# Patient Record
Sex: FEMALE | Race: BLACK OR AFRICAN AMERICAN | NOT HISPANIC OR LATINO
[De-identification: names, ages, dates, MRNs, and addresses within clinical notes are randomized per-mention and may not be internally consistent; named-entity substitution may affect disease eponyms.]

---

## 2019-07-21 ENCOUNTER — TRANSCRIPTION ENCOUNTER (OUTPATIENT)
Age: 49
End: 2019-07-21

## 2019-07-21 ENCOUNTER — EMERGENCY (EMERGENCY)
Facility: HOSPITAL | Age: 49
LOS: 0 days | Discharge: HOME | End: 2019-07-21
Attending: EMERGENCY MEDICINE
Payer: MEDICAID

## 2019-07-21 VITALS
HEART RATE: 92 BPM | TEMPERATURE: 98 F | DIASTOLIC BLOOD PRESSURE: 73 MMHG | HEIGHT: 64 IN | RESPIRATION RATE: 16 BRPM | WEIGHT: 151.9 LBS | SYSTOLIC BLOOD PRESSURE: 150 MMHG | OXYGEN SATURATION: 100 %

## 2019-07-21 VITALS
TEMPERATURE: 98 F | HEART RATE: 72 BPM | RESPIRATION RATE: 16 BRPM | OXYGEN SATURATION: 100 % | DIASTOLIC BLOOD PRESSURE: 73 MMHG | SYSTOLIC BLOOD PRESSURE: 144 MMHG

## 2019-07-21 DIAGNOSIS — N83.201 UNSPECIFIED OVARIAN CYST, RIGHT SIDE: ICD-10-CM

## 2019-07-21 DIAGNOSIS — R10.9 UNSPECIFIED ABDOMINAL PAIN: ICD-10-CM

## 2019-07-21 DIAGNOSIS — E11.9 TYPE 2 DIABETES MELLITUS WITHOUT COMPLICATIONS: ICD-10-CM

## 2019-07-21 DIAGNOSIS — Z97.0 PRESENCE OF ARTIFICIAL EYE: Chronic | ICD-10-CM

## 2019-07-21 LAB
ALBUMIN SERPL ELPH-MCNC: 3.5 G/DL — SIGNIFICANT CHANGE UP (ref 3.3–5)
ALP SERPL-CCNC: 81 U/L — SIGNIFICANT CHANGE UP (ref 40–120)
ALT FLD-CCNC: 16 U/L — SIGNIFICANT CHANGE UP (ref 12–78)
ANION GAP SERPL CALC-SCNC: 8 MMOL/L — SIGNIFICANT CHANGE UP (ref 5–17)
APPEARANCE UR: CLEAR — SIGNIFICANT CHANGE UP
AST SERPL-CCNC: 18 U/L — SIGNIFICANT CHANGE UP (ref 15–37)
BASOPHILS # BLD AUTO: 0.02 K/UL — SIGNIFICANT CHANGE UP (ref 0–0.2)
BASOPHILS NFR BLD AUTO: 0.2 % — SIGNIFICANT CHANGE UP (ref 0–2)
BILIRUB SERPL-MCNC: 1.1 MG/DL — SIGNIFICANT CHANGE UP (ref 0.2–1.2)
BILIRUB UR-MCNC: NEGATIVE — SIGNIFICANT CHANGE UP
BUN SERPL-MCNC: 9 MG/DL — SIGNIFICANT CHANGE UP (ref 7–23)
CALCIUM SERPL-MCNC: 9 MG/DL — SIGNIFICANT CHANGE UP (ref 8.5–10.1)
CHLORIDE SERPL-SCNC: 102 MMOL/L — SIGNIFICANT CHANGE UP (ref 96–108)
CO2 SERPL-SCNC: 25 MMOL/L — SIGNIFICANT CHANGE UP (ref 22–31)
COLOR SPEC: YELLOW — SIGNIFICANT CHANGE UP
CREAT SERPL-MCNC: 1.03 MG/DL — SIGNIFICANT CHANGE UP (ref 0.5–1.3)
DIFF PNL FLD: ABNORMAL
EOSINOPHIL # BLD AUTO: 0.01 K/UL — SIGNIFICANT CHANGE UP (ref 0–0.5)
EOSINOPHIL NFR BLD AUTO: 0.1 % — SIGNIFICANT CHANGE UP (ref 0–6)
GLUCOSE BLDC GLUCOMTR-MCNC: 311 MG/DL — HIGH (ref 70–99)
GLUCOSE SERPL-MCNC: 266 MG/DL — HIGH (ref 70–99)
GLUCOSE UR QL: 1000 MG/DL
HCG SERPL-ACNC: <1 MIU/ML — SIGNIFICANT CHANGE UP
HCT VFR BLD CALC: 38.8 % — SIGNIFICANT CHANGE UP (ref 34.5–45)
HGB BLD-MCNC: 13.1 G/DL — SIGNIFICANT CHANGE UP (ref 11.5–15.5)
IMM GRANULOCYTES NFR BLD AUTO: 0.3 % — SIGNIFICANT CHANGE UP (ref 0–1.5)
KETONES UR-MCNC: ABNORMAL
LEUKOCYTE ESTERASE UR-ACNC: ABNORMAL
LIDOCAIN IGE QN: 45 U/L — LOW (ref 73–393)
LYMPHOCYTES # BLD AUTO: 1.36 K/UL — SIGNIFICANT CHANGE UP (ref 1–3.3)
LYMPHOCYTES # BLD AUTO: 13.1 % — SIGNIFICANT CHANGE UP (ref 13–44)
MCHC RBC-ENTMCNC: 29.2 PG — SIGNIFICANT CHANGE UP (ref 27–34)
MCHC RBC-ENTMCNC: 33.8 GM/DL — SIGNIFICANT CHANGE UP (ref 32–36)
MCV RBC AUTO: 86.6 FL — SIGNIFICANT CHANGE UP (ref 80–100)
MONOCYTES # BLD AUTO: 0.56 K/UL — SIGNIFICANT CHANGE UP (ref 0–0.9)
MONOCYTES NFR BLD AUTO: 5.4 % — SIGNIFICANT CHANGE UP (ref 2–14)
NEUTROPHILS # BLD AUTO: 8.44 K/UL — HIGH (ref 1.8–7.4)
NEUTROPHILS NFR BLD AUTO: 80.9 % — HIGH (ref 43–77)
NITRITE UR-MCNC: NEGATIVE — SIGNIFICANT CHANGE UP
NRBC # BLD: 0 /100 WBCS — SIGNIFICANT CHANGE UP (ref 0–0)
PH UR: 5 — SIGNIFICANT CHANGE UP (ref 5–8)
PLATELET # BLD AUTO: 264 K/UL — SIGNIFICANT CHANGE UP (ref 150–400)
POTASSIUM SERPL-MCNC: 4.5 MMOL/L — SIGNIFICANT CHANGE UP (ref 3.5–5.3)
POTASSIUM SERPL-SCNC: 4.5 MMOL/L — SIGNIFICANT CHANGE UP (ref 3.5–5.3)
PROT SERPL-MCNC: 8.4 GM/DL — HIGH (ref 6–8.3)
PROT UR-MCNC: NEGATIVE MG/DL — SIGNIFICANT CHANGE UP
RBC # BLD: 4.48 M/UL — SIGNIFICANT CHANGE UP (ref 3.8–5.2)
RBC # FLD: 11.9 % — SIGNIFICANT CHANGE UP (ref 10.3–14.5)
SODIUM SERPL-SCNC: 135 MMOL/L — SIGNIFICANT CHANGE UP (ref 135–145)
SP GR SPEC: 1 — LOW (ref 1.01–1.02)
UROBILINOGEN FLD QL: NEGATIVE MG/DL — SIGNIFICANT CHANGE UP
WBC # BLD: 10.42 K/UL — SIGNIFICANT CHANGE UP (ref 3.8–10.5)
WBC # FLD AUTO: 10.42 K/UL — SIGNIFICANT CHANGE UP (ref 3.8–10.5)

## 2019-07-21 PROCEDURE — 74177 CT ABD & PELVIS W/CONTRAST: CPT | Mod: 26

## 2019-07-21 PROCEDURE — 99285 EMERGENCY DEPT VISIT HI MDM: CPT

## 2019-07-21 PROCEDURE — 76830 TRANSVAGINAL US NON-OB: CPT | Mod: 26

## 2019-07-21 RX ORDER — SODIUM CHLORIDE 9 MG/ML
1000 INJECTION INTRAMUSCULAR; INTRAVENOUS; SUBCUTANEOUS ONCE
Refills: 0 | Status: COMPLETED | OUTPATIENT
Start: 2019-07-21 | End: 2019-07-21

## 2019-07-21 RX ORDER — KETOROLAC TROMETHAMINE 30 MG/ML
15 SYRINGE (ML) INJECTION ONCE
Refills: 0 | Status: DISCONTINUED | OUTPATIENT
Start: 2019-07-21 | End: 2019-07-21

## 2019-07-21 RX ORDER — IBUPROFEN 200 MG
1 TABLET ORAL
Qty: 30 | Refills: 0
Start: 2019-07-21 | End: 2019-07-30

## 2019-07-21 RX ORDER — MORPHINE SULFATE 50 MG/1
4 CAPSULE, EXTENDED RELEASE ORAL ONCE
Refills: 0 | Status: DISCONTINUED | OUTPATIENT
Start: 2019-07-21 | End: 2019-07-21

## 2019-07-21 RX ORDER — ONDANSETRON 8 MG/1
4 TABLET, FILM COATED ORAL ONCE
Refills: 0 | Status: COMPLETED | OUTPATIENT
Start: 2019-07-21 | End: 2019-07-21

## 2019-07-21 RX ORDER — IOHEXOL 300 MG/ML
30 INJECTION, SOLUTION INTRAVENOUS ONCE
Refills: 0 | Status: COMPLETED | OUTPATIENT
Start: 2019-07-21 | End: 2019-07-21

## 2019-07-21 RX ADMIN — MORPHINE SULFATE 4 MILLIGRAM(S): 50 CAPSULE, EXTENDED RELEASE ORAL at 13:37

## 2019-07-21 RX ADMIN — ONDANSETRON 4 MILLIGRAM(S): 8 TABLET, FILM COATED ORAL at 12:40

## 2019-07-21 RX ADMIN — SODIUM CHLORIDE 1000 MILLILITER(S): 9 INJECTION INTRAMUSCULAR; INTRAVENOUS; SUBCUTANEOUS at 17:35

## 2019-07-21 RX ADMIN — Medication 15 MILLIGRAM(S): at 19:15

## 2019-07-21 RX ADMIN — MORPHINE SULFATE 4 MILLIGRAM(S): 50 CAPSULE, EXTENDED RELEASE ORAL at 17:34

## 2019-07-21 RX ADMIN — SODIUM CHLORIDE 1000 MILLILITER(S): 9 INJECTION INTRAMUSCULAR; INTRAVENOUS; SUBCUTANEOUS at 13:30

## 2019-07-21 RX ADMIN — MORPHINE SULFATE 4 MILLIGRAM(S): 50 CAPSULE, EXTENDED RELEASE ORAL at 14:07

## 2019-07-21 RX ADMIN — MORPHINE SULFATE 4 MILLIGRAM(S): 50 CAPSULE, EXTENDED RELEASE ORAL at 18:04

## 2019-07-21 RX ADMIN — SODIUM CHLORIDE 1000 MILLILITER(S): 9 INJECTION INTRAMUSCULAR; INTRAVENOUS; SUBCUTANEOUS at 13:37

## 2019-07-21 RX ADMIN — IOHEXOL 30 MILLILITER(S): 300 INJECTION, SOLUTION INTRAVENOUS at 13:23

## 2019-07-21 NOTE — ED ADULT NURSE NOTE - CHIEF COMPLAINT QUOTE
pt c/o RLQ PAIN STARTED LAST WEEK  AT THAT TIME ONLY N/V ASSOCIATED WITH THE DISCOMFORT TODAY, PT STATES 9/10 PAIN SCALE

## 2019-07-21 NOTE — ED PROVIDER NOTE - CARE PROVIDER_API CALL
Joel De La Cruz)  Obstetrics and Gynecology  [address] [place] [address]  Phone: [phone]  Fax: [phone]  Follow Up Time:

## 2019-07-21 NOTE — ED ADULT NURSE NOTE - NSIMPLEMENTINTERV_GEN_ALL_ED
Implemented All Universal Safety Interventions:  [place] to call system. Call bell, personal items and telephone within reach. Instruct patient to call for assistance. Room bathroom lighting operational. Non-slip footwear when patient is off stretcher. Physically safe environment: no spills, clutter or unnecessary equipment. Stretcher in lowest position, wheels locked, appropriate side rails in place.

## 2019-07-21 NOTE — ED PROVIDER NOTE - CLINICAL SUMMARY MEDICAL DECISION MAKING FREE TEXT BOX
Pt with large ovarian cyst. DW Dr Tompkins x 2, pt improved, tolerating po. Will dc to follow up with GYN tomorrow morning with copies of results, to return if worse. Precautions reviewed.

## 2019-07-21 NOTE — ED PROVIDER NOTE - OBJECTIVE STATEMENT
48 year old female came to the ED because of right lower abd pain that is associated with decreased appetite. She has had these symptoms for the last 3 days and it feels different than when she had a kidney stone. No fever, no chills, no urinary complaints, no headache, no back pain, no chest pain, no sob.

## 2019-07-21 NOTE — ED PROVIDER NOTE - PROGRESS NOTE DETAILS
DW Dr Amaro, he recommends dc and outpatient follow up. Pt is feeling better and knows that she must follow up. She has gyn at a private clinic, but will give the [facility] and dr amaro's info. Precautions reviewed.

## 2019-07-22 ENCOUNTER — RESULT REVIEW (OUTPATIENT)
Age: 49
End: 2019-07-22

## 2019-07-22 ENCOUNTER — TRANSCRIPTION ENCOUNTER (OUTPATIENT)
Age: 49
End: 2019-07-22

## 2019-07-22 ENCOUNTER — INPATIENT (INPATIENT)
Facility: HOSPITAL | Age: 49
LOS: 0 days | Discharge: HOME | End: 2019-07-23
Attending: OBSTETRICS & GYNECOLOGY | Admitting: OBSTETRICS & GYNECOLOGY
Payer: MEDICAID

## 2019-07-22 VITALS
TEMPERATURE: 98 F | RESPIRATION RATE: 20 BRPM | OXYGEN SATURATION: 100 % | SYSTOLIC BLOOD PRESSURE: 160 MMHG | HEART RATE: 85 BPM | DIASTOLIC BLOOD PRESSURE: 81 MMHG

## 2019-07-22 DIAGNOSIS — N83.201 UNSPECIFIED OVARIAN CYST, RIGHT SIDE: ICD-10-CM

## 2019-07-22 DIAGNOSIS — Z98.890 OTHER SPECIFIED POSTPROCEDURAL STATES: Chronic | ICD-10-CM

## 2019-07-22 DIAGNOSIS — Z97.0 PRESENCE OF ARTIFICIAL EYE: Chronic | ICD-10-CM

## 2019-07-22 DIAGNOSIS — Z98.891 HISTORY OF UTERINE SCAR FROM PREVIOUS SURGERY: Chronic | ICD-10-CM

## 2019-07-22 LAB
ALBUMIN SERPL ELPH-MCNC: 3.5 G/DL — SIGNIFICANT CHANGE UP (ref 3.3–5)
ALP SERPL-CCNC: 64 U/L — SIGNIFICANT CHANGE UP (ref 40–120)
ALT FLD-CCNC: 8 U/L — SIGNIFICANT CHANGE UP (ref 4–33)
ANION GAP SERPL CALC-SCNC: 10 MMO/L — SIGNIFICANT CHANGE UP (ref 7–14)
APPEARANCE UR: CLEAR — SIGNIFICANT CHANGE UP
APTT BLD: 35.6 SEC — SIGNIFICANT CHANGE UP (ref 27.5–36.3)
AST SERPL-CCNC: 16 U/L — SIGNIFICANT CHANGE UP (ref 4–32)
BACTERIA # UR AUTO: SIGNIFICANT CHANGE UP
BASE EXCESS BLDV CALC-SCNC: 3.8 MMOL/L — SIGNIFICANT CHANGE UP
BASOPHILS # BLD AUTO: 0.01 K/UL — SIGNIFICANT CHANGE UP (ref 0–0.2)
BASOPHILS NFR BLD AUTO: 0.1 % — SIGNIFICANT CHANGE UP (ref 0–2)
BASOPHILS NFR SPEC: 0 % — SIGNIFICANT CHANGE UP (ref 0–2)
BILIRUB SERPL-MCNC: 0.7 MG/DL — SIGNIFICANT CHANGE UP (ref 0.2–1.2)
BILIRUB UR-MCNC: NEGATIVE — SIGNIFICANT CHANGE UP
BLASTS # FLD: 0 % — SIGNIFICANT CHANGE UP (ref 0–0)
BLD GP AB SCN SERPL QL: NEGATIVE — SIGNIFICANT CHANGE UP
BLOOD GAS VENOUS - CREATININE: SIGNIFICANT CHANGE UP MG/DL (ref 0.5–1.3)
BLOOD UR QL VISUAL: SIGNIFICANT CHANGE UP
BUN SERPL-MCNC: 9 MG/DL — SIGNIFICANT CHANGE UP (ref 7–23)
CALCIUM SERPL-MCNC: 9.2 MG/DL — SIGNIFICANT CHANGE UP (ref 8.4–10.5)
CHLORIDE BLDV-SCNC: 106 MMOL/L — SIGNIFICANT CHANGE UP (ref 96–108)
CHLORIDE SERPL-SCNC: 105 MMOL/L — SIGNIFICANT CHANGE UP (ref 98–107)
CO2 SERPL-SCNC: 25 MMOL/L — SIGNIFICANT CHANGE UP (ref 22–31)
COLOR SPEC: SIGNIFICANT CHANGE UP
CREAT SERPL-MCNC: 0.65 MG/DL — SIGNIFICANT CHANGE UP (ref 0.5–1.3)
CULTURE RESULTS: SIGNIFICANT CHANGE UP
EOSINOPHIL # BLD AUTO: 0 K/UL — SIGNIFICANT CHANGE UP (ref 0–0.5)
EOSINOPHIL NFR BLD AUTO: 0 % — SIGNIFICANT CHANGE UP (ref 0–6)
EOSINOPHIL NFR FLD: 0 % — SIGNIFICANT CHANGE UP (ref 0–6)
GAS PNL BLDV: 136 MMOL/L — SIGNIFICANT CHANGE UP (ref 136–146)
GIANT PLATELETS BLD QL SMEAR: PRESENT — SIGNIFICANT CHANGE UP
GLUCOSE BLDC GLUCOMTR-MCNC: 105 MG/DL — HIGH (ref 70–99)
GLUCOSE BLDV-MCNC: 151 MG/DL — HIGH (ref 70–99)
GLUCOSE SERPL-MCNC: 152 MG/DL — HIGH (ref 70–99)
GLUCOSE UR-MCNC: 100 — SIGNIFICANT CHANGE UP
HCO3 BLDV-SCNC: 26 MMOL/L — SIGNIFICANT CHANGE UP (ref 20–27)
HCT VFR BLD CALC: 38.6 % — SIGNIFICANT CHANGE UP (ref 34.5–45)
HCT VFR BLDV CALC: 39.3 % — SIGNIFICANT CHANGE UP (ref 34.5–45)
HGB BLD-MCNC: 12.9 G/DL — SIGNIFICANT CHANGE UP (ref 11.5–15.5)
HGB BLDV-MCNC: 12.8 G/DL — SIGNIFICANT CHANGE UP (ref 11.5–15.5)
HYALINE CASTS # UR AUTO: NEGATIVE — SIGNIFICANT CHANGE UP
IMM GRANULOCYTES NFR BLD AUTO: 0.4 % — SIGNIFICANT CHANGE UP (ref 0–1.5)
INR BLD: 1.11 — SIGNIFICANT CHANGE UP (ref 0.88–1.17)
KETONES UR-MCNC: NEGATIVE — SIGNIFICANT CHANGE UP
LACTATE BLDV-MCNC: 1.6 MMOL/L — SIGNIFICANT CHANGE UP (ref 0.5–2)
LEUKOCYTE ESTERASE UR-ACNC: SIGNIFICANT CHANGE UP
LYMPHOCYTES # BLD AUTO: 1.33 K/UL — SIGNIFICANT CHANGE UP (ref 1–3.3)
LYMPHOCYTES # BLD AUTO: 13.9 % — SIGNIFICANT CHANGE UP (ref 13–44)
LYMPHOCYTES NFR SPEC AUTO: 17.4 % — SIGNIFICANT CHANGE UP (ref 13–44)
MACROCYTES BLD QL: SLIGHT — SIGNIFICANT CHANGE UP
MCHC RBC-ENTMCNC: 29 PG — SIGNIFICANT CHANGE UP (ref 27–34)
MCHC RBC-ENTMCNC: 33.4 % — SIGNIFICANT CHANGE UP (ref 32–36)
MCV RBC AUTO: 86.7 FL — SIGNIFICANT CHANGE UP (ref 80–100)
METAMYELOCYTES # FLD: 0 % — SIGNIFICANT CHANGE UP (ref 0–1)
MICROCYTES BLD QL: SIGNIFICANT CHANGE UP
MONOCYTES # BLD AUTO: 0.64 K/UL — SIGNIFICANT CHANGE UP (ref 0–0.9)
MONOCYTES NFR BLD AUTO: 6.7 % — SIGNIFICANT CHANGE UP (ref 2–14)
MONOCYTES NFR BLD: 7 % — SIGNIFICANT CHANGE UP (ref 2–9)
MYELOCYTES NFR BLD: 0 % — SIGNIFICANT CHANGE UP (ref 0–0)
NEUTROPHIL AB SER-ACNC: 70.4 % — SIGNIFICANT CHANGE UP (ref 43–77)
NEUTROPHILS # BLD AUTO: 7.55 K/UL — HIGH (ref 1.8–7.4)
NEUTROPHILS NFR BLD AUTO: 78.9 % — HIGH (ref 43–77)
NEUTS BAND # BLD: 0.9 % — SIGNIFICANT CHANGE UP (ref 0–6)
NITRITE UR-MCNC: NEGATIVE — SIGNIFICANT CHANGE UP
NRBC # FLD: 0.04 K/UL — SIGNIFICANT CHANGE UP (ref 0–0)
OTHER - HEMATOLOGY %: 0 — SIGNIFICANT CHANGE UP
PCO2 BLDV: 49 MMHG — SIGNIFICANT CHANGE UP (ref 41–51)
PH BLDV: 7.38 PH — SIGNIFICANT CHANGE UP (ref 7.32–7.43)
PH UR: 7 — SIGNIFICANT CHANGE UP (ref 5–8)
PLATELET # BLD AUTO: 254 K/UL — SIGNIFICANT CHANGE UP (ref 150–400)
PLATELET COUNT - ESTIMATE: NORMAL — SIGNIFICANT CHANGE UP
PMV BLD: 12.2 FL — SIGNIFICANT CHANGE UP (ref 7–13)
PO2 BLDV: 30 MMHG — LOW (ref 35–40)
POTASSIUM BLDV-SCNC: 3.8 MMOL/L — SIGNIFICANT CHANGE UP (ref 3.4–4.5)
POTASSIUM SERPL-MCNC: 4.2 MMOL/L — SIGNIFICANT CHANGE UP (ref 3.5–5.3)
POTASSIUM SERPL-SCNC: 4.2 MMOL/L — SIGNIFICANT CHANGE UP (ref 3.5–5.3)
PROMYELOCYTES # FLD: 0 % — SIGNIFICANT CHANGE UP (ref 0–0)
PROT SERPL-MCNC: 7.5 G/DL — SIGNIFICANT CHANGE UP (ref 6–8.3)
PROT UR-MCNC: NEGATIVE — SIGNIFICANT CHANGE UP
PROTHROM AB SERPL-ACNC: 12.7 SEC — SIGNIFICANT CHANGE UP (ref 9.8–13.1)
RBC # BLD: 4.45 M/UL — SIGNIFICANT CHANGE UP (ref 3.8–5.2)
RBC # FLD: 11.9 % — SIGNIFICANT CHANGE UP (ref 10.3–14.5)
RBC CASTS # UR COMP ASSIST: SIGNIFICANT CHANGE UP (ref 0–?)
RH IG SCN BLD-IMP: POSITIVE — SIGNIFICANT CHANGE UP
RH IG SCN BLD-IMP: POSITIVE — SIGNIFICANT CHANGE UP
SAO2 % BLDV: 54.7 % — LOW (ref 60–85)
SODIUM SERPL-SCNC: 140 MMOL/L — SIGNIFICANT CHANGE UP (ref 135–145)
SP GR SPEC: 1.01 — SIGNIFICANT CHANGE UP (ref 1–1.04)
SPECIMEN SOURCE: SIGNIFICANT CHANGE UP
SQUAMOUS # UR AUTO: SIGNIFICANT CHANGE UP
UROBILINOGEN FLD QL: NORMAL — SIGNIFICANT CHANGE UP
VARIANT LYMPHS # BLD: 4.3 % — SIGNIFICANT CHANGE UP
WBC # BLD: 9.57 K/UL — SIGNIFICANT CHANGE UP (ref 3.8–10.5)
WBC # FLD AUTO: 9.57 K/UL — SIGNIFICANT CHANGE UP (ref 3.8–10.5)
WBC UR QL: SIGNIFICANT CHANGE UP (ref 0–?)

## 2019-07-22 PROCEDURE — 88305 TISSUE EXAM BY PATHOLOGIST: CPT | Mod: 26

## 2019-07-22 PROCEDURE — 58661 LAPAROSCOPY REMOVE ADNEXA: CPT

## 2019-07-22 RX ORDER — HYDROMORPHONE HYDROCHLORIDE 2 MG/ML
0.5 INJECTION INTRAMUSCULAR; INTRAVENOUS; SUBCUTANEOUS
Refills: 0 | Status: DISCONTINUED | OUTPATIENT
Start: 2019-07-22 | End: 2019-07-23

## 2019-07-22 RX ORDER — MORPHINE SULFATE 50 MG/1
2 CAPSULE, EXTENDED RELEASE ORAL ONCE
Refills: 0 | Status: DISCONTINUED | OUTPATIENT
Start: 2019-07-22 | End: 2019-07-22

## 2019-07-22 RX ORDER — INSULIN LISPRO 100/ML
VIAL (ML) SUBCUTANEOUS
Refills: 0 | Status: DISCONTINUED | OUTPATIENT
Start: 2019-07-22 | End: 2019-07-23

## 2019-07-22 RX ORDER — DEXTROSE 50 % IN WATER 50 %
15 SYRINGE (ML) INTRAVENOUS ONCE
Refills: 0 | Status: DISCONTINUED | OUTPATIENT
Start: 2019-07-22 | End: 2019-07-23

## 2019-07-22 RX ORDER — GLUCAGON INJECTION, SOLUTION 0.5 MG/.1ML
1 INJECTION, SOLUTION SUBCUTANEOUS ONCE
Refills: 0 | Status: DISCONTINUED | OUTPATIENT
Start: 2019-07-22 | End: 2019-07-23

## 2019-07-22 RX ORDER — INSULIN LISPRO 100/ML
VIAL (ML) SUBCUTANEOUS AT BEDTIME
Refills: 0 | Status: DISCONTINUED | OUTPATIENT
Start: 2019-07-22 | End: 2019-07-23

## 2019-07-22 RX ORDER — SODIUM CHLORIDE 9 MG/ML
1000 INJECTION, SOLUTION INTRAVENOUS
Refills: 0 | Status: DISCONTINUED | OUTPATIENT
Start: 2019-07-22 | End: 2019-07-23

## 2019-07-22 RX ORDER — DEXTROSE 50 % IN WATER 50 %
25 SYRINGE (ML) INTRAVENOUS ONCE
Refills: 0 | Status: DISCONTINUED | OUTPATIENT
Start: 2019-07-22 | End: 2019-07-23

## 2019-07-22 RX ORDER — FENTANYL CITRATE 50 UG/ML
50 INJECTION INTRAVENOUS
Refills: 0 | Status: DISCONTINUED | OUTPATIENT
Start: 2019-07-22 | End: 2019-07-23

## 2019-07-22 RX ORDER — DEXTROSE 50 % IN WATER 50 %
12.5 SYRINGE (ML) INTRAVENOUS ONCE
Refills: 0 | Status: DISCONTINUED | OUTPATIENT
Start: 2019-07-22 | End: 2019-07-23

## 2019-07-22 RX ORDER — ONDANSETRON 8 MG/1
4 TABLET, FILM COATED ORAL ONCE
Refills: 0 | Status: DISCONTINUED | OUTPATIENT
Start: 2019-07-22 | End: 2019-07-23

## 2019-07-22 RX ORDER — SODIUM CHLORIDE 9 MG/ML
1000 INJECTION, SOLUTION INTRAVENOUS
Refills: 0 | Status: DISCONTINUED | OUTPATIENT
Start: 2019-07-22 | End: 2019-07-22

## 2019-07-22 RX ORDER — MORPHINE SULFATE 50 MG/1
4 CAPSULE, EXTENDED RELEASE ORAL ONCE
Refills: 0 | Status: DISCONTINUED | OUTPATIENT
Start: 2019-07-22 | End: 2019-07-22

## 2019-07-22 RX ADMIN — MORPHINE SULFATE 2 MILLIGRAM(S): 50 CAPSULE, EXTENDED RELEASE ORAL at 18:24

## 2019-07-22 RX ADMIN — SODIUM CHLORIDE 125 MILLILITER(S): 9 INJECTION, SOLUTION INTRAVENOUS at 23:45

## 2019-07-22 RX ADMIN — MORPHINE SULFATE 2 MILLIGRAM(S): 50 CAPSULE, EXTENDED RELEASE ORAL at 18:54

## 2019-07-22 RX ADMIN — MORPHINE SULFATE 2 MILLIGRAM(S): 50 CAPSULE, EXTENDED RELEASE ORAL at 14:16

## 2019-07-22 RX ADMIN — SODIUM CHLORIDE 125 MILLILITER(S): 9 INJECTION, SOLUTION INTRAVENOUS at 14:16

## 2019-07-22 NOTE — H&P ADULT - NSHPLABSRESULTS_GEN_ALL_CORE
TVUS (7/21 at [facility]): 10cm cyst of right ovary with single septation, imagees reviewed with Dr Trimble

## 2019-07-22 NOTE — H&P ADULT - ASSESSMENT
48  with suspected ovarian torsion. The patient understands that she will need surgical management due to suspected torsion. We discussed with management of torsion and the recommendation to perform salpingooophorectomy due to her age.

## 2019-07-22 NOTE — ED ADULT NURSE NOTE - CHIEF COMPLAINT QUOTE
c/o rt lower abdominal pain since Thursday, was at [facility] yesterday and dx with rt cyst, told to come to [facility] if pain got worse, LMP 7/17

## 2019-07-22 NOTE — BRIEF OPERATIVE NOTE - NSICDXBRIEFPROCEDURE_GEN_ALL_CORE_FT
PROCEDURES:  Right ureterolysis 22-Jul-2019 23:36:12  Madison Romeo  Laparoscopic salpingo-oophorectomy, right 22-Jul-2019 23:35:59  Madison Romeo

## 2019-07-22 NOTE — ED ADULT TRIAGE NOTE - CHIEF COMPLAINT QUOTE
c/o rt lower abdominal pain since Thursday, was at [facility] yesterday and dx with rt cyst, told to come to [facility] if pain got worse, LMP 7/17

## 2019-07-22 NOTE — ED ADULT NURSE NOTE - OBJECTIVE STATEMENT
pt received to intake #12 with c/o ovarian pain. states she was seen at [facility] and diagnosed with a right ovarian cyst, was told they do not have GYN services there and if pain increases to come [facility]. pt denies cp, sob, n/v/d, vaginal bleeding/discharge and urinary symptoms.  at bedside. seen by MD. IV placed, labs drawn and sent. pt to go to .

## 2019-07-22 NOTE — H&P ADULT - PROBLEM SELECTOR PLAN 1
-admit to GYN  -NPO, added on to OR for LSC RSO possible right possible ex-lap  -FS and ISS for T1DM  -Pain control  seen and examined with Dr. Atilio Steiner PGY4

## 2019-07-22 NOTE — H&P ADULT - HISTORY OF PRESENT ILLNESS
48  PMH T1DM presents with severe RLQ pain since Thursday. At first the pain felt like cramping/miscarriage pain. Yesterday she went to [facility] ED where she was diagnosed with large cyst with single septation and told to come to [facility] ED with worsening pain as they do not have GYN service. She has been unable to eat due to the pain since yesterday evening but tolerated some sips of water this AM. +nausea -vomiting. denies/fevers/chills. denies CP/SOB.

## 2019-07-22 NOTE — DISCHARGE NOTE PROVIDER - NSDCFUADDINST_GEN_ALL_CORE_FT
Postoperative Instructions    Pain control    For pain control, take the followin. Motrin 600mg four times a day, take with food  2. Add Tylenol 975 four times a day, alternated with motrin  3. Add oxycodone as needed for severe pain not managed well by motrin and tylenol. A prescription has been sent to your pharmacy on file.     Motrin and Tylenol can be obtained over the counter.    Postoperative restrictions  Nothing in the vagina (tampons, sexual intercourse), No tub baths, pools or hot tubs for 6 weeks (showers are ok!). No lifting anything heavier than 15 lbs, no strenuous exercise for 6 weeks after surgery. Do not pull or cut any stitches that you see around your incision.    Vaginal bleeding    Spotting per vagina is normal in first few weeks after surgery. If you are soaking 1 pad per hour, that is not normal and you should notify your doctor's office and seek medical attention right away.

## 2019-07-22 NOTE — ED PROVIDER NOTE - OBJECTIVE STATEMENT
47 yo F pmh c-sections in past, DM presents for abdominal pain.  Patient states pain started 4 days ago, RLQ, non-radiating, sharp, intermittent, feels at rest.  Went to [facility] one night ago and had CT and Ultrasound showing 9.3 cm right ovarian mass, but with good waveforms.  Patients pain improved and was discharged.  States this morning she is with worsening pain.  No fevers, chills, nausea, vomiting, vaginal bleeding, dysuria.

## 2019-07-22 NOTE — BRIEF OPERATIVE NOTE - OPERATION/FINDINGS
approx. 14cm right ovarian cyst adherent to ureterosacral ligament and in the broad ligament, not torsed. right pelvic side wall opened and ureter dissected out. Right round ligament transected for better visualization and right salpingoophorectomy performed, pelvic copiously irrigated and Elsa applied over pelvic side wall, intraoperative drainage of cyst for visualization approx. 14cm right ovarian cyst adherent to ureterosacral ligament and in the broad ligament, not torsed. right pelvic side wall opened and ureter dissected out. Right round ligament transected for better visualization and right salpingoophorectomy performed, pelvic copiously irrigated and Elsa applied over pelvic side wall, intraoperative drainage of cyst for visualization left fallopian tube and ovary wnl, upper abdomen within normal limits

## 2019-07-22 NOTE — DISCHARGE NOTE PROVIDER - NSDCCPTREATMENT_GEN_ALL_CORE_FT
PRINCIPAL PROCEDURE  Procedure: Laparoscopic salpingo-oophorectomy, right  Findings and Treatment:       SECONDARY PROCEDURE  Procedure: Right ureterolysis  Findings and Treatment:

## 2019-07-22 NOTE — ED PROVIDER NOTE - CLINICAL SUMMARY MEDICAL DECISION MAKING FREE TEXT BOX
49 yo F pmh c-sections in past, DM, known right ovarian cyst presents for abdominal pain.  VSS afebrile.  Exam non-toxic appearing, GYN exam with RLQ and adnexal tenderness.  Given ovarian mass, intermittent vs. active torsion considered.  GYN paged, possible repeat imaging.  Labs, IVF, pain control.  Dispo pending.

## 2019-07-22 NOTE — DISCHARGE NOTE PROVIDER - PROVIDER TOKENS
FREE:[LAST:[[facility]],PHONE:[phone]],FAX:[(   )    -],ADDRESS:[[facility]  Oncology Building  Third Floor  Ambulatory Care Unit],FOLLOWUP:[2 weeks]]

## 2019-07-22 NOTE — DISCHARGE NOTE PROVIDER - HOSPITAL COURSE
48y woman admitted with abdominal pain found to have 10cm simple septated cyst admitted for suspected torsion, now s/p laparoscopic Right Salpingo-oophorectomy, , Hct 38.6. No torsion noted at the time of surgery. Patient was discharged on POD#0 ambulating, tolerating PO, voiding spontaneously, and with stable vitals. Patient is to have close outpatient follow-up with her private gyn. 48y woman admitted with abdominal pain found to have 10cm simple septated cyst admitted for suspected torsion, now s/p laparoscopic Right Salpingo-oophorectomy, , Hct 38.6. No torsion noted at the time of surgery. Patient was discharged on POD#0 ambulating, tolerating PO, voiding spontaneously, and with stable vitals. Patient is to have close outpatient follow-up with her private gyn or [facility].

## 2019-07-22 NOTE — DISCHARGE NOTE PROVIDER - CARE PROVIDER_API CALL
[facility],   [facility]  Oncology Building  Third Floor  Ambulatory Care Unit  Phone: [phone]  Fax: (   )    -  Follow Up Time: 2 weeks

## 2019-07-22 NOTE — ED ADULT NURSE NOTE - NSIMPLEMENTINTERV_GEN_ALL_ED
Implemented All Universal Safety Interventions:  [place] to call system. Call bell, personal items and telephone within reach. Instruct patient to call for assistance. Room bathroom lighting operational. Non-slip footwear when patient is off stretcher. Physically safe environment: no spills, clutter or unnecessary equipment. Stretcher in lowest position, wheels locked, appropriate side rails in place.

## 2019-07-22 NOTE — H&P ADULT - NSHPPHYSICALEXAM_GEN_ALL_CORE
PE gen: writhing/distress due to pain  abd: TTP RLQ, +guarding  : deferred due to patient discomfort

## 2019-07-23 ENCOUNTER — TRANSCRIPTION ENCOUNTER (OUTPATIENT)
Age: 49
End: 2019-07-23

## 2019-07-23 ENCOUNTER — RESULT REVIEW (OUTPATIENT)
Age: 49
End: 2019-07-23

## 2019-07-23 VITALS
DIASTOLIC BLOOD PRESSURE: 54 MMHG | HEART RATE: 63 BPM | TEMPERATURE: 98 F | RESPIRATION RATE: 16 BRPM | SYSTOLIC BLOOD PRESSURE: 132 MMHG | OXYGEN SATURATION: 99 %

## 2019-07-23 PROBLEM — E11.9 TYPE 2 DIABETES MELLITUS WITHOUT COMPLICATIONS: Chronic | Status: ACTIVE | Noted: 2019-07-21

## 2019-07-23 PROBLEM — S05.90XA UNSPECIFIED INJURY OF UNSPECIFIED EYE AND ORBIT, INITIAL ENCOUNTER: Chronic | Status: ACTIVE | Noted: 2019-07-21

## 2019-07-23 LAB — HBA1C BLD-MCNC: 9.6 % — HIGH (ref 4–5.6)

## 2019-07-23 PROCEDURE — 88112 CYTOPATH CELL ENHANCE TECH: CPT | Mod: 26

## 2019-07-23 RX ORDER — IBUPROFEN 200 MG
1 TABLET ORAL
Qty: 0 | Refills: 0 | DISCHARGE
Start: 2019-07-23

## 2019-07-23 RX ORDER — ACETAMINOPHEN 500 MG
975 TABLET ORAL EVERY 6 HOURS
Refills: 0 | Status: DISCONTINUED | OUTPATIENT
Start: 2019-07-23 | End: 2019-07-23

## 2019-07-23 RX ORDER — OXYCODONE HYDROCHLORIDE 5 MG/1
1 TABLET ORAL
Qty: 10 | Refills: 0
Start: 2019-07-23

## 2019-07-23 RX ORDER — IBUPROFEN 200 MG
600 TABLET ORAL EVERY 6 HOURS
Refills: 0 | Status: DISCONTINUED | OUTPATIENT
Start: 2019-07-23 | End: 2019-07-23

## 2019-07-23 RX ORDER — OXYCODONE HYDROCHLORIDE 5 MG/1
5 TABLET ORAL EVERY 6 HOURS
Refills: 0 | Status: DISCONTINUED | OUTPATIENT
Start: 2019-07-23 | End: 2019-07-23

## 2019-07-23 RX ORDER — ACETAMINOPHEN 500 MG
3 TABLET ORAL
Qty: 0 | Refills: 0 | DISCHARGE
Start: 2019-07-23

## 2019-07-23 RX ADMIN — SODIUM CHLORIDE 125 MILLILITER(S): 9 INJECTION, SOLUTION INTRAVENOUS at 05:09

## 2019-07-23 NOTE — DISCHARGE NOTE NURSING/CASE MANAGEMENT/SOCIAL WORK - NSDCPNINST_GEN_ALL_CORE
SxS of infection.  Fever, pain at incision site, redness, swelling, pus, bleeding.  If unable to reach MD please go to the emergency room.

## 2019-07-23 NOTE — DISCHARGE NOTE NURSING/CASE MANAGEMENT/SOCIAL WORK - MODE OF TRANSPORTATION
Wheelchair/Stroller
Do not make important decisions/Do not drive or operate machinery/No Heavy lifting/straining

## 2019-07-23 NOTE — DISCHARGE NOTE NURSING/CASE MANAGEMENT/SOCIAL WORK - NSDCDPATPORTLINK_GEN_ALL_CORE
You can access the [facility] Patient Portal, offered by [facility], by registering with the following website: [email or web address]

## 2019-07-24 LAB — NON-GYNECOLOGICAL CYTOLOGY STUDY: SIGNIFICANT CHANGE UP

## 2019-07-25 PROBLEM — E10.9 TYPE 1 DIABETES MELLITUS WITHOUT COMPLICATIONS: Chronic | Status: ACTIVE | Noted: 2019-07-22

## 2019-07-25 PROBLEM — N83.201 UNSPECIFIED OVARIAN CYST, RIGHT SIDE: Chronic | Status: ACTIVE | Noted: 2019-07-22

## 2019-07-25 PROBLEM — Z00.00 ENCOUNTER FOR PREVENTIVE HEALTH EXAMINATION: Status: ACTIVE | Noted: 2019-07-25

## 2019-07-27 ENCOUNTER — INPATIENT (INPATIENT)
Facility: HOSPITAL | Age: 49
LOS: 6 days | Discharge: HOME | End: 2019-08-03
Attending: INTERNAL MEDICINE | Admitting: INTERNAL MEDICINE
Payer: MEDICAID

## 2019-07-27 VITALS
WEIGHT: 149.91 LBS | TEMPERATURE: 98 F | RESPIRATION RATE: 20 BRPM | DIASTOLIC BLOOD PRESSURE: 78 MMHG | OXYGEN SATURATION: 100 % | SYSTOLIC BLOOD PRESSURE: 141 MMHG | HEART RATE: 107 BPM

## 2019-07-27 DIAGNOSIS — E11.10 TYPE 2 DIABETES MELLITUS WITH KETOACIDOSIS WITHOUT COMA: ICD-10-CM

## 2019-07-27 DIAGNOSIS — Z98.890 OTHER SPECIFIED POSTPROCEDURAL STATES: Chronic | ICD-10-CM

## 2019-07-27 DIAGNOSIS — Z98.891 HISTORY OF UTERINE SCAR FROM PREVIOUS SURGERY: Chronic | ICD-10-CM

## 2019-07-27 DIAGNOSIS — Z97.0 PRESENCE OF ARTIFICIAL EYE: Chronic | ICD-10-CM

## 2019-07-27 LAB
ALBUMIN SERPL ELPH-MCNC: 3.7 G/DL — SIGNIFICANT CHANGE UP (ref 3.3–5)
ALP SERPL-CCNC: 103 U/L — SIGNIFICANT CHANGE UP (ref 40–120)
ALT FLD-CCNC: 6 U/L — SIGNIFICANT CHANGE UP (ref 4–33)
ANION GAP SERPL CALC-SCNC: 17 MMO/L — HIGH (ref 7–14)
ANION GAP SERPL CALC-SCNC: 23 MMO/L — HIGH (ref 7–14)
APPEARANCE UR: CLEAR — SIGNIFICANT CHANGE UP
APTT BLD: 35.2 SEC — SIGNIFICANT CHANGE UP (ref 27.5–36.3)
AST SERPL-CCNC: 6 U/L — SIGNIFICANT CHANGE UP (ref 4–32)
B-OH-BUTYR SERPL-SCNC: 6.1 MMOL/L — HIGH (ref 0–0.4)
BACTERIA # UR AUTO: NEGATIVE — SIGNIFICANT CHANGE UP
BASE EXCESS BLDV CALC-SCNC: -1.2 MMOL/L — SIGNIFICANT CHANGE UP
BASE EXCESS BLDV CALC-SCNC: -3.8 MMOL/L — SIGNIFICANT CHANGE UP
BASOPHILS # BLD AUTO: 0.02 K/UL — SIGNIFICANT CHANGE UP (ref 0–0.2)
BASOPHILS NFR BLD AUTO: 0.1 % — SIGNIFICANT CHANGE UP (ref 0–2)
BILIRUB SERPL-MCNC: 0.5 MG/DL — SIGNIFICANT CHANGE UP (ref 0.2–1.2)
BILIRUB UR-MCNC: NEGATIVE — SIGNIFICANT CHANGE UP
BLOOD GAS VENOUS - CREATININE: 0.83 MG/DL — SIGNIFICANT CHANGE UP (ref 0.5–1.3)
BLOOD GAS VENOUS - CREATININE: SIGNIFICANT CHANGE UP MG/DL (ref 0.5–1.3)
BLOOD UR QL VISUAL: HIGH
BUN SERPL-MCNC: 13 MG/DL — SIGNIFICANT CHANGE UP (ref 7–23)
BUN SERPL-MCNC: 9 MG/DL — SIGNIFICANT CHANGE UP (ref 7–23)
CALCIUM SERPL-MCNC: 10.3 MG/DL — SIGNIFICANT CHANGE UP (ref 8.4–10.5)
CALCIUM SERPL-MCNC: 9.1 MG/DL — SIGNIFICANT CHANGE UP (ref 8.4–10.5)
CHLORIDE BLDV-SCNC: 100 MMOL/L — SIGNIFICANT CHANGE UP (ref 96–108)
CHLORIDE BLDV-SCNC: 100 MMOL/L — SIGNIFICANT CHANGE UP (ref 96–108)
CHLORIDE SERPL-SCNC: 100 MMOL/L — SIGNIFICANT CHANGE UP (ref 98–107)
CHLORIDE SERPL-SCNC: 92 MMOL/L — LOW (ref 98–107)
CO2 SERPL-SCNC: 16 MMOL/L — LOW (ref 22–31)
CO2 SERPL-SCNC: 20 MMOL/L — LOW (ref 22–31)
COLOR SPEC: SIGNIFICANT CHANGE UP
CREAT SERPL-MCNC: 0.81 MG/DL — SIGNIFICANT CHANGE UP (ref 0.5–1.3)
CREAT SERPL-MCNC: 0.92 MG/DL — SIGNIFICANT CHANGE UP (ref 0.5–1.3)
EOSINOPHIL # BLD AUTO: 0 K/UL — SIGNIFICANT CHANGE UP (ref 0–0.5)
EOSINOPHIL NFR BLD AUTO: 0 % — SIGNIFICANT CHANGE UP (ref 0–6)
GAS PNL BLDV: 132 MMOL/L — LOW (ref 136–146)
GAS PNL BLDV: 135 MMOL/L — LOW (ref 136–146)
GLUCOSE BLDV-MCNC: 343 MG/DL — HIGH (ref 70–99)
GLUCOSE BLDV-MCNC: 436 MG/DL — HIGH (ref 70–99)
GLUCOSE SERPL-MCNC: 219 MG/DL — HIGH (ref 70–99)
GLUCOSE SERPL-MCNC: 442 MG/DL — HIGH (ref 70–99)
GLUCOSE UR-MCNC: >1000 — HIGH
HCG SERPL-ACNC: < 5 MIU/ML — SIGNIFICANT CHANGE UP
HCO3 BLDV-SCNC: 19 MMOL/L — LOW (ref 20–27)
HCO3 BLDV-SCNC: 22 MMOL/L — SIGNIFICANT CHANGE UP (ref 20–27)
HCT VFR BLD CALC: 39 % — SIGNIFICANT CHANGE UP (ref 34.5–45)
HCT VFR BLDV CALC: 35.2 % — SIGNIFICANT CHANGE UP (ref 34.5–45)
HCT VFR BLDV CALC: 38.5 % — SIGNIFICANT CHANGE UP (ref 34.5–45)
HGB BLD-MCNC: 12.9 G/DL — SIGNIFICANT CHANGE UP (ref 11.5–15.5)
HGB BLDV-MCNC: 11.4 G/DL — LOW (ref 11.5–15.5)
HGB BLDV-MCNC: 12.5 G/DL — SIGNIFICANT CHANGE UP (ref 11.5–15.5)
HYALINE CASTS # UR AUTO: NEGATIVE — SIGNIFICANT CHANGE UP
IMM GRANULOCYTES NFR BLD AUTO: 0.5 % — SIGNIFICANT CHANGE UP (ref 0–1.5)
INR BLD: 1.1 — SIGNIFICANT CHANGE UP (ref 0.88–1.17)
KETONES UR-MCNC: >150 — HIGH
LACTATE BLDV-MCNC: 0.9 MMOL/L — SIGNIFICANT CHANGE UP (ref 0.5–2)
LACTATE BLDV-MCNC: 2.3 MMOL/L — HIGH (ref 0.5–2)
LEUKOCYTE ESTERASE UR-ACNC: SIGNIFICANT CHANGE UP
LIDOCAIN IGE QN: 6.4 U/L — LOW (ref 7–60)
LYMPHOCYTES # BLD AUTO: 0.7 K/UL — LOW (ref 1–3.3)
LYMPHOCYTES # BLD AUTO: 3.7 % — LOW (ref 13–44)
MCHC RBC-ENTMCNC: 29 PG — SIGNIFICANT CHANGE UP (ref 27–34)
MCHC RBC-ENTMCNC: 33.1 % — SIGNIFICANT CHANGE UP (ref 32–36)
MCV RBC AUTO: 87.6 FL — SIGNIFICANT CHANGE UP (ref 80–100)
MONOCYTES # BLD AUTO: 0.97 K/UL — HIGH (ref 0–0.9)
MONOCYTES NFR BLD AUTO: 5.1 % — SIGNIFICANT CHANGE UP (ref 2–14)
NEUTROPHILS # BLD AUTO: 17.13 K/UL — HIGH (ref 1.8–7.4)
NEUTROPHILS NFR BLD AUTO: 90.6 % — HIGH (ref 43–77)
NITRITE UR-MCNC: NEGATIVE — SIGNIFICANT CHANGE UP
NRBC # FLD: 0 K/UL — SIGNIFICANT CHANGE UP (ref 0–0)
PCO2 BLDV: 46 MMHG — SIGNIFICANT CHANGE UP (ref 41–51)
PCO2 BLDV: 51 MMHG — SIGNIFICANT CHANGE UP (ref 41–51)
PH BLDV: 7.26 PH — LOW (ref 7.32–7.43)
PH BLDV: 7.34 PH — SIGNIFICANT CHANGE UP (ref 7.32–7.43)
PH UR: 5.5 — SIGNIFICANT CHANGE UP (ref 5–8)
PLATELET # BLD AUTO: 393 K/UL — SIGNIFICANT CHANGE UP (ref 150–400)
PMV BLD: 11.5 FL — SIGNIFICANT CHANGE UP (ref 7–13)
PO2 BLDV: 17 MMHG — LOW (ref 35–40)
PO2 BLDV: 32 MMHG — LOW (ref 35–40)
POTASSIUM BLDV-SCNC: 4.5 MMOL/L — SIGNIFICANT CHANGE UP (ref 3.4–4.5)
POTASSIUM BLDV-SCNC: SIGNIFICANT CHANGE UP MMOL/L (ref 3.4–4.5)
POTASSIUM SERPL-MCNC: 3.7 MMOL/L — SIGNIFICANT CHANGE UP (ref 3.5–5.3)
POTASSIUM SERPL-MCNC: 4.2 MMOL/L — SIGNIFICANT CHANGE UP (ref 3.5–5.3)
POTASSIUM SERPL-SCNC: 3.7 MMOL/L — SIGNIFICANT CHANGE UP (ref 3.5–5.3)
POTASSIUM SERPL-SCNC: 4.2 MMOL/L — SIGNIFICANT CHANGE UP (ref 3.5–5.3)
PROT SERPL-MCNC: 8.6 G/DL — HIGH (ref 6–8.3)
PROT UR-MCNC: 20 — SIGNIFICANT CHANGE UP
PROTHROM AB SERPL-ACNC: 12.6 SEC — SIGNIFICANT CHANGE UP (ref 9.8–13.1)
RBC # BLD: 4.45 M/UL — SIGNIFICANT CHANGE UP (ref 3.8–5.2)
RBC # FLD: 11.9 % — SIGNIFICANT CHANGE UP (ref 10.3–14.5)
RBC CASTS # UR COMP ASSIST: SIGNIFICANT CHANGE UP (ref 0–?)
SAO2 % BLDV: 23.6 % — LOW (ref 60–85)
SAO2 % BLDV: 52.3 % — LOW (ref 60–85)
SODIUM SERPL-SCNC: 131 MMOL/L — LOW (ref 135–145)
SODIUM SERPL-SCNC: 137 MMOL/L — SIGNIFICANT CHANGE UP (ref 135–145)
SP GR SPEC: 1.02 — SIGNIFICANT CHANGE UP (ref 1–1.04)
SQUAMOUS # UR AUTO: SIGNIFICANT CHANGE UP
UROBILINOGEN FLD QL: NORMAL — SIGNIFICANT CHANGE UP
WBC # BLD: 18.91 K/UL — HIGH (ref 3.8–10.5)
WBC # FLD AUTO: 18.91 K/UL — HIGH (ref 3.8–10.5)
WBC UR QL: HIGH (ref 0–?)

## 2019-07-27 PROCEDURE — 71045 X-RAY EXAM CHEST 1 VIEW: CPT | Mod: 26

## 2019-07-27 PROCEDURE — 74177 CT ABD & PELVIS W/CONTRAST: CPT | Mod: 26

## 2019-07-27 RX ORDER — SODIUM CHLORIDE 9 MG/ML
1000 INJECTION, SOLUTION INTRAVENOUS
Refills: 0 | Status: DISCONTINUED | OUTPATIENT
Start: 2019-07-27 | End: 2019-07-27

## 2019-07-27 RX ORDER — SODIUM CHLORIDE 9 MG/ML
2000 INJECTION, SOLUTION INTRAVENOUS ONCE
Refills: 0 | Status: DISCONTINUED | OUTPATIENT
Start: 2019-07-27 | End: 2019-07-27

## 2019-07-27 RX ORDER — POTASSIUM CHLORIDE 20 MEQ
20 PACKET (EA) ORAL ONCE
Refills: 0 | Status: DISCONTINUED | OUTPATIENT
Start: 2019-07-27 | End: 2019-07-28

## 2019-07-27 RX ORDER — PIPERACILLIN AND TAZOBACTAM 4; .5 G/20ML; G/20ML
3.38 INJECTION, POWDER, LYOPHILIZED, FOR SOLUTION INTRAVENOUS ONCE
Refills: 0 | Status: COMPLETED | OUTPATIENT
Start: 2019-07-27 | End: 2019-07-27

## 2019-07-27 RX ORDER — SODIUM CHLORIDE 9 MG/ML
1000 INJECTION INTRAMUSCULAR; INTRAVENOUS; SUBCUTANEOUS ONCE
Refills: 0 | Status: DISCONTINUED | OUTPATIENT
Start: 2019-07-27 | End: 2019-07-27

## 2019-07-27 RX ORDER — PIPERACILLIN AND TAZOBACTAM 4; .5 G/20ML; G/20ML
3.38 INJECTION, POWDER, LYOPHILIZED, FOR SOLUTION INTRAVENOUS EVERY 8 HOURS
Refills: 0 | Status: DISCONTINUED | OUTPATIENT
Start: 2019-07-27 | End: 2019-07-31

## 2019-07-27 RX ORDER — ACETAMINOPHEN 500 MG
975 TABLET ORAL ONCE
Refills: 0 | Status: COMPLETED | OUTPATIENT
Start: 2019-07-27 | End: 2019-07-27

## 2019-07-27 RX ORDER — MORPHINE SULFATE 50 MG/1
4 CAPSULE, EXTENDED RELEASE ORAL ONCE
Refills: 0 | Status: DISCONTINUED | OUTPATIENT
Start: 2019-07-27 | End: 2019-07-27

## 2019-07-27 RX ORDER — ONDANSETRON 8 MG/1
4 TABLET, FILM COATED ORAL ONCE
Refills: 0 | Status: COMPLETED | OUTPATIENT
Start: 2019-07-27 | End: 2019-07-27

## 2019-07-27 RX ORDER — CHLORHEXIDINE GLUCONATE 213 G/1000ML
1 SOLUTION TOPICAL
Refills: 0 | Status: DISCONTINUED | OUTPATIENT
Start: 2019-07-27 | End: 2019-07-29

## 2019-07-27 RX ORDER — SODIUM CHLORIDE 9 MG/ML
2000 INJECTION INTRAMUSCULAR; INTRAVENOUS; SUBCUTANEOUS ONCE
Refills: 0 | Status: COMPLETED | OUTPATIENT
Start: 2019-07-27 | End: 2019-07-27

## 2019-07-27 RX ORDER — INSULIN HUMAN 100 [IU]/ML
4 INJECTION, SOLUTION SUBCUTANEOUS
Qty: 100 | Refills: 0 | Status: DISCONTINUED | OUTPATIENT
Start: 2019-07-27 | End: 2019-07-28

## 2019-07-27 RX ADMIN — MORPHINE SULFATE 4 MILLIGRAM(S): 50 CAPSULE, EXTENDED RELEASE ORAL at 20:45

## 2019-07-27 RX ADMIN — PIPERACILLIN AND TAZOBACTAM 200 GRAM(S): 4; .5 INJECTION, POWDER, LYOPHILIZED, FOR SOLUTION INTRAVENOUS at 16:51

## 2019-07-27 RX ADMIN — MORPHINE SULFATE 4 MILLIGRAM(S): 50 CAPSULE, EXTENDED RELEASE ORAL at 23:22

## 2019-07-27 RX ADMIN — ONDANSETRON 4 MILLIGRAM(S): 8 TABLET, FILM COATED ORAL at 20:45

## 2019-07-27 RX ADMIN — Medication 975 MILLIGRAM(S): at 19:11

## 2019-07-27 RX ADMIN — INSULIN HUMAN 6 UNIT(S)/HR: 100 INJECTION, SOLUTION SUBCUTANEOUS at 19:10

## 2019-07-27 RX ADMIN — Medication 975 MILLIGRAM(S): at 16:51

## 2019-07-27 RX ADMIN — SODIUM CHLORIDE 1000 MILLILITER(S): 9 INJECTION INTRAMUSCULAR; INTRAVENOUS; SUBCUTANEOUS at 17:00

## 2019-07-27 RX ADMIN — MORPHINE SULFATE 4 MILLIGRAM(S): 50 CAPSULE, EXTENDED RELEASE ORAL at 21:15

## 2019-07-27 RX ADMIN — ONDANSETRON 4 MILLIGRAM(S): 8 TABLET, FILM COATED ORAL at 16:51

## 2019-07-27 RX ADMIN — ONDANSETRON 4 MILLIGRAM(S): 8 TABLET, FILM COATED ORAL at 23:22

## 2019-07-27 NOTE — H&P ADULT - NSHPPHYSICALEXAM_GEN_ALL_CORE
PHYSICAL EXAM:  General: NAD, resting comfortably  HEENT: NC/AT, PERRLA, EOMI, oropharnyx clear, no LAD  CV: RRR, normal S1,S2; no murmurs; no peripheral edema   Pulm: lungs CTAB, no crackles, rhonchi, or wheezes  Abd: soft, non-tender, non-distended, normal bowel sounds  Psych/Neuro: A&Ox3, nonfocal, strength grossly intact, normal affect  Skin: warm, dry PHYSICAL EXAM:  General: lying in bed, uncomfortable, in pain  HEENT: NC/AT, R eye prosthesis, L pupil reactive, oropharnyx clear, no LAD  CV: tachycardic, regular, normal S1,S2; no murmurs; no peripheral edema   Pulm: lungs CTAB, no crackles, rhonchi, or wheezes  Abd: soft, epigastric and upper left quadrant tenderness to palpation, non-distended, normal bowel sounds  : L CVA tenderness  Psych/Neuro: A&Ox3, nonfocal, strength grossly intact, normal affect  Skin: warm, dry, 3 well-healing incisions lower abdomen

## 2019-07-27 NOTE — CONSULT NOTE ADULT - SUBJECTIVE AND OBJECTIVE BOX
SICU Consult Note     CHIEF COMPLAINT: L flank pain, N/V    HPI / INTERVAL HISTORY:    47 yo with PMH of Type 1 DM, prosthetic R eye s/p enucleation,  who presents POD#5 s/p laparoscopic right salpingooophorectomy () for presumed torsion with 14cm cyst, likely endometrioma. Patient presented to ED with worsening, intermittent left flank pain, radiating to groin associated nausea/vomiting, fevers, chills for last 3 days. Tmax 101 at home. Also admits to urinary frequency, constipation with last BM 3 days ago and scant brown vaginal discharge since her surgery. Denies dysuria, hematuria. Compliant with insulin regimen.   In the ED, vitals were T 100.5F HR 94 /80 RR 14 O2 100% on RA.   On labs, WBC 18.91  AG 23 BHB 6.1 lactate 2.3. UA with moderate blood, ketones, small LE, 11-25 WBCs.   Pt was placed on insulin gtt, given Zofran, Tylenol, 2L NS. Pending CT A/P IV contrast results.     PAST MEDICAL & SURGICAL HISTORY:  Diabetes mellitus type I  Cyst of right ovary  Diabetes  Eye injury  History of eye enucleation: right  H/O  section  Prosthetic eye globe   delivery delivered      FAMILY HISTORY:  No pertinent family history in first degree relatives  No pertinent family history in first degree relatives      SOCIAL HISTORY:  Smoking: [  ] Never Smoked  [  ] Former Smoker (# packs x # years)  [  ] Current Smoker (# packs x # years)  Substance Use:   EtOH Use:   Marital Status: [  ] Single  [  ]   [  ]   [  ]   Sexual History:   Occupation:  Recent Travel:  Country of Birth:   Advance Directives:     HOME MEDICATIONS:      Allergies    No Known Allergies    Intolerances          REVIEW OF SYSTEMS:  General: +fever, +chills  HENT: denies nasal congestion, sore throat, rhinorrhea  Eyes: denies vision changes  CV: denies chest pain  Resp: denies difficulty breathing, cough  Abdominal: +nausea,+vomiting, diarrhea, +abdominal pain, blood in stool, dark stool  : denies pain with urination  MSK: denies recent trauma  Neuro: denies headaches, numbness, tingling, dizziness, lightheadedness.  Skin: denies new rashes  Endocrine: denies recent weight loss     OBJECTIVE:  ICU Vital Signs Last 24 Hrs  T(C): 36.8 (2019 19:50), Max: 38.1 (2019 16:37)  T(F): 98.3 (2019 19:50), Max: 100.5 (2019 16:37)  HR: 86 (2019 19:50) (86 - 107)  BP: 163/65 (2019 19:50) (141/78 - 163/65)  BP(mean): 102 (2019 19:50) (102 - 102)  ABP: --  ABP(mean): --  RR: 20 (2019 19:50) (14 - 20)  SpO2: 100% (2019 19:50) (100% - 100%)        CAPILLARY BLOOD GLUCOSE      POCT Blood Glucose.: 309 mg/dL (2019 20:09)      PHYSICAL EXAM:    CONSTITUTIONAL: Nontoxic, well nourished, well developed, middle-aged female, resting comfortably in no acute distress  HEAD: Normocephalic; atraumatic  EYES: Normal inspection, EOMI, R prosthetic eye   ENMT: External appears normal; normal oropharynx  NECK: Supple; non-tender; no cervical lymphadenopathy  CARD: RRR; no audible murmurs, rubs, or gallops  RESP: No respiratory distress, lungs ctab/l  ABD: Soft, non-distended; mildly tender in RLQ; no rebound or guarding  : No CVAT  EXT: No LE pitting edema or calf tenderness; distal pulses intact with good capillary refill  SKIN: Warm, dry, intact  NEURO: aaox3, moving all extremities spontaneously       HOSPITAL MEDICATIONS:  MEDICATIONS  (STANDING):  insulin regular Infusion 6 Unit(s)/Hr (6 mL/Hr) IV Continuous <Continuous>  morphine  - Injectable 4 milliGRAM(s) IV Push Once  ondansetron Injectable 4 milliGRAM(s) IV Push once  potassium chloride  20 mEq/100 mL IVPB 20 milliEquivalent(s) IV Intermittent once    MEDICATIONS  (PRN):      LABS:                        12.9   18.91 )-----------( 393      ( 2019 16:27 )             39.0     Hgb Trend: 12.9<--, 12.9<--, 13.1<--  07-    131<L>  |  92<L>  |  13  ----------------------------<  442<H>  4.2   |  16<L>  |  0.92    Ca    10.3      2019 16:27    TPro  8.6<H>  /  Alb  3.7  /  TBili  0.5  /  DBili  x   /  AST  6   /  ALT  6   /  AlkPhos  103  -    Creatinine Trend: 0.92<--, 0.65<--, 1.03<--  PT/INR - ( 2019 16:53 )   PT: 12.6 SEC;   INR: 1.10          PTT - ( 2019 16:53 )  PTT:35.2 SEC  Urinalysis Basic - ( 2019 16:58 )    Color: LIGHT YELLOW / Appearance: CLEAR / S.018 / pH: 5.5  Gluc: >1000 / Ketone: >150  / Bili: NEGATIVE / Urobili: NORMAL   Blood: MODERATE / Protein: 20 / Nitrite: NEGATIVE   Leuk Esterase: SMALL / RBC: 0-2 / WBC 11-25   Sq Epi: OCC / Non Sq Epi: x / Bacteria: NEGATIVE        Venous Blood Gas:   @ 18:50  7.34/46/32/22/52.3  VBG Lactate: 0.9  Venous Blood Gas:   @ 16:27  7.26/51/17/19/23.6  VBG Lactate: 2.3      MICROBIOLOGY:     RADIOLOGY & ADDITIONAL TESTS:

## 2019-07-27 NOTE — ED PROVIDER NOTE - PHYSICAL EXAMINATION
general: uncomfortable appearing female, mild distress   heent: normocephalic, atraumatic   respiratory: normal work of breathing, lungs clear to auscultation bilaterally   cardiac: tachycardic, regular rhythm   abdomen: soft, LLQ tenderness to palpation  msk; no swelling or tenderness of lower extremities   skin: no rashes   neuro: A&Ox3

## 2019-07-27 NOTE — H&P ADULT - ASSESSMENT
Ms. June is a 47 yo woman w/ PMH of T1DM, R eye enucleation, kidney stone, right ovarian cyst s/p salpingooophorectomy 7/22/19 presenting with severe left flank pain and fever at home, found to be in DKA, also with leukocytosis, fever, and CT suggestive of b/l pyelonephritis.       Neuro:   -pt awake, alert, oriented, NAD but uncomfortable due to pain  -pain did not respond to morphine in ED  -Dilaudid 1 mg iv q4h for severe pain  -Tylenol prn    Pulm:  -lungs CTAB, breathing comfortably    CV:  -Hemodynamically stable  -H/H stable     GI:   -CT A/P: heterogenous enhancement of kidneys w/ perinephric fat stranding, but no abscess or other acute intraabdominal process  -Zofran prn for nausea and vomiting    :  -UA w/ moderate blood, no bacteria, small LE, 10-25 WBCs, plus no urinary symptoms - overall not very suggestive of UTI, despite findings on CT suggestive of b/l pyelonephritis  -Creatinine normal  -Microscopic hematuria possibly due to nephrolithiasis or other post-op changes     Endo:  -DKA, ketones in blood/urine, initially w/ AG 23 -> down to 17   -On insulin gtt  -FS q1hr and BMP, VBG q2h   -Glucose now down to 155 - will start D5+1/2NS 100 cc/hr    ID:  -WBCs 19 and fever to 100.5 on admission  -No findings on CT A/P suggestive of abscess or other post-operative complication  -CT showed heterogenous enhancement of kidneys w/ perinephric fat stranding, suggestive of b/l pyelone   -f/u cultures  -Continue with Zosyn    PPX:   -SQH q8h Ms. June is a 47 yo woman w/ PMH of T1DM, R eye enucleation, kidney stone, right ovarian cyst s/p salpingooophorectomy 7/22/19 presenting with severe left flank pain and fever at home, found to be in DKA, also with leukocytosis, fever, CVA tenderness and CT suggestive of b/l pyelonephritis.        Neuro:   -pt awake, alert, oriented, NAD but uncomfortable due to pain  -pain did not respond to morphine in ED  -Dilaudid 1 mg iv q4h for severe pain  -Tylenol prn    Pulm:  -lungs CTAB, breathing comfortably    CV:  -Hemodynamically stable  -H/H stable     GI:   -CT A/P: heterogenous enhancement of kidneys w/ perinephric fat stranding, but no abscess or other acute intraabdominal process  -Zofran prn for nausea and vomiting    :  -UA w/ moderate blood, no bacteria, small LE, 10-25 WBCs, plus no urinary symptoms - overall not very suggestive of UTI, despite findings on CT suggestive of b/l pyelonephritis  -f/u UCx  -Creatinine normal  -Microscopic hematuria possibly due to nephrolithiasis or other post-op changes     Endo:  -DKA, ketones in blood/urine, initially w/ AG 23 -> down to 17   -On insulin gtt  -FS q1hr and BMP, VBG q2h   -Glucose now down to 155 - will start D5+1/2NS 100 cc/hr    ID:  -WBCs 19 and fever to 100.5 on admission  -No findings on CT A/P suggestive of abscess or other post-operative complication  -CT showed heterogenous enhancement of kidneys w/ perinephric fat stranding, suggestive of b/l pyelonephritis, but UA not suggestive and pt denies urinary symptoms.    -f/u blood/urine cultures  -Continue with Zosyn    PPX:   -SQH q8h Ms. June is a 47 yo woman w/ PMH of T1DM, R eye enucleation, kidney stone, right ovarian cyst s/p salpingooophorectomy 7/22/19 presenting with severe left flank pain and fever at home, found to be in DKA, also with leukocytosis, fever, CVA tenderness and CT suggestive of b/l pyelonephritis.        Neuro:   -pt awake, alert, oriented, NAD but uncomfortable due to pain  -pain did not respond to morphine in ED  -Dilaudid 1 mg iv q4h for severe pain  -Tylenol prn    Pulm:  -lungs CTAB, breathing comfortably    CV:  -Hemodynamically stable  -H/H stable     GI:   -CT A/P: heterogenous enhancement of kidneys w/ perinephric fat stranding, but no abscess or other acute intraabdominal process  -Zofran prn for nausea and vomiting    :  -UA w/ moderate blood, no bacteria, small LE, 10-25 WBCs, plus no urinary symptoms - overall not very suggestive of UTI, despite findings on CT suggestive of b/l pyelonephritis  -f/u UCx  -Creatinine normal  -Microscopic hematuria possibly due to nephrolithiasis or other post-op changes     Endo:  -DKA, ketones in blood/urine, initially w/ AG 23 -> down to 17   -On insulin gtt  -FS q1hr and BMP, VBG q2h   -Glucose now down to 155 - will start D5+1/2NS 100 cc/hr  -Home regimen: Basaglar 12 u qhs, Humalog 4 u before breakfast, 6 u before lunch and dinner    ID:  -WBCs 19 and fever to 100.5 on admission  -No findings on CT A/P suggestive of abscess or other post-operative complication  -CT showed heterogenous enhancement of kidneys w/ perinephric fat stranding, suggestive of b/l pyelonephritis, but UA not suggestive and pt denies urinary symptoms.    -f/u blood/urine cultures  -Continue with Zosyn    PPX:   -SQH q8h Ms. June is a 49 yo woman w/ PMH of T1DM, R eye enucleation, kidney stone, right ovarian cyst s/p salpingooophorectomy 7/22/19 presenting with severe left flank pain and fever at home, found to be in DKA, also with leukocytosis, fever, CVA tenderness and CT suggestive of b/l pyelonephritis.        Neuro:   -pt awake, alert, oriented, NAD but uncomfortable due to pain  -pain did not respond to morphine in ED  -Dilaudid 1 mg iv q4h for severe pain  -Tylenol prn    Pulm:  -lungs CTAB, breathing comfortably    CV:  -Hemodynamically stable  -H/H stable     GI:   -CT A/P: heterogenous enhancement of kidneys w/ perinephric fat stranding, but no abscess or other acute intraabdominal process  -Zofran prn for nausea and vomiting    :  -UA w/ moderate blood, 0-2 RBCs, no bacteria, small LE, 10-25 WBCs, plus no urinary symptoms - overall not very suggestive of UTI, despite findings on CT suggestive of b/l pyelonephritis  -f/u UCx  -Creatinine normal      Endo:  -DKA, ketones in blood/urine, initially w/ AG 23 -> down to 17   -On insulin gtt  -FS q1hr and BMP, VBG q2h   -Glucose now down to 155 - will start D5+1/2NS 100 cc/hr  -Home regimen: Basaglar 12 u qhs, Humalog 4 u before breakfast, 6 u before lunch and dinner    ID:  -WBCs 19 and fever to 100.5 on admission  -No findings on CT A/P suggestive of abscess or other post-operative complication  -CT showed heterogenous enhancement of kidneys w/ perinephric fat stranding, suggestive of b/l pyelonephritis, but UA not suggestive and pt denies urinary symptoms.    -f/u blood/urine cultures  -Continue with Zosyn    PPX:   -SQH q8h Ms. June is a 47 yo woman w/ PMH of T1DM, R eye enucleation, kidney stone, right ovarian cyst s/p salpingooophorectomy 7/22/19 presenting with severe left flank pain and fever at home, found to be in DKA, also with leukocytosis, fever, CVA tenderness and CT suggestive of b/l pyelonephritis.        Neuro:   -pt awake, alert, oriented, NAD but uncomfortable due to pain  -pain did not respond to morphine in ED  -Dilaudid 1 mg iv q4h for severe pain  -Tylenol prn    Pulm:  -lungs CTAB, breathing comfortably    CV:  -Hemodynamically stable  -H/H stable     GI:   -CT A/P: heterogenous enhancement of kidneys w/ perinephric fat stranding, but no abscess or other acute intraabdominal process  -Zofran prn for nausea and vomiting    :  -UA w/ moderate blood, 0-2 RBCs, no bacteria, small LE, 10-25 WBCs, plus no urinary symptoms - overall not very suggestive of UTI, despite findings on CT suggestive of b/l pyelonephritis  -f/u UCx  -Creatinine normal      Endo:  -DKA, ketones in blood/urine, initially w/ AG 23 -> down to 17   -On insulin gtt  -FS q1hr and BMP, VBG q4h   -Glucose now down to 155 - will start D5+1/2NS 100 cc/hr  -Home regimen: Basaglar 12 u qhs, Humalog 4 u before breakfast, 6 u before lunch and dinner    ID:  -WBCs 19 and fever to 100.5 on admission  -No findings on CT A/P suggestive of abscess or other post-operative complication  -CT showed heterogenous enhancement of kidneys w/ perinephric fat stranding, suggestive of b/l pyelonephritis, but UA not suggestive and pt denies urinary symptoms.    -f/u blood/urine cultures  -Continue with Zosyn    PPX:   -SQH q8h

## 2019-07-27 NOTE — CONSULT NOTE ADULT - SUBJECTIVE AND OBJECTIVE BOX
48F, pmh of DM1, right ovarian cyst s/p salpingoophorectomy 7/22 presenting with back pain and fever to 101 at home. In the ED patient was found to have labs  patient endorses L flank pain associated with nausea, vomiting, fevers and chills.  Pain  comes and goes. Pt also endorsing constipation, last BM 3 days ago  had fever yesterday to 101 at home. 48F, PMH of DM1, R eye infection, kidney stone, right ovarian cyst s/p salpingooophorectomy  presenting with flank pain and fever at home. Patient states that ever since Wednesday she has been having L sided flank pain radiating to the front that was similar to when she had ovarian torsion. Pain comes and goes and it is 10/10 in intensity, has progressively gotten worse. She endorses nausea and vomiting. She denies any vaginal discharge but endorses spotting. Denies burning urination or foul smell in the urine. In the ED patient was found to be in DKA. She is s/p zosyn and 2L of IVF, currently on insulin drip.     REVIEW OF SYSTEMS:  CONSTITUTIONAL: + fevers  EYES/ENT: No visual changes  NECK: No pain or stiffness  RESPIRATORY: No cough, wheezing, hemoptysis  CARDIOVASCULAR: No chest pain or palpitations  GASTROINTESTINAL: + N/V, abd pain and flank pain   GENITOURINARY: No dysuria, frequency or hematuria  NEUROLOGICAL: No numbness or weakness  SKIN: No itching, burning, rashes, or lesions   All other review of systems is negative unless indicated above.      PAST MEDICAL & SURGICAL HISTORY:  Diabetes mellitus type I  Cyst of right ovary  Diabetes  Eye injury  History of eye enucleation: right  H/O  section  Prosthetic eye globe   delivery delivered      FAMILY HISTORY:  No pertinent family history in first degree relatives  No pertinent family history in first degree relatives      SOCIAL HISTORY:  denies smoking, alcohol use or drug use.     Allergies    No Known Allergies    Intolerances        HOME MEDICATIONS: basaglar 12units QHS, solostar  4//6 units      OBJECTIVE:  ICU Vital Signs Last 24 Hrs  T(C): 36.8 (2019 19:50), Max: 38.1 (2019 16:37)  T(F): 98.3 (2019 19:50), Max: 100.5 (2019 16:37)  HR: 86 (2019 19:50) (86 - 107)  BP: 163/65 (2019 19:50) (141/78 - 163/65)  BP(mean): 102 (2019 19:50) (102 - 102)  ABP: --  ABP(mean): --  RR: 20 (2019 19:50) (14 - 20)  SpO2: 100% (2019 19:50) (100% - 100%)        CAPILLARY BLOOD GLUCOSE      POCT Blood Glucose.: 309 mg/dL (2019 20:09)      PHYSICAL EXAM:  GENERAL: slight distress   HEAD:  Atraumatic, Normocephalic  EYES: conjunctiva and sclera clear  NECK: Supple  HEART: tachycardic; No murmurs, rubs, or gallops  RESPIRATORY: CTA B/L, No W/R/R  ABDOMEN: L upper abd tenderness, small surgical scar around umbilicus intact   NEUROLOGY: A&Ox3, nonfocal, moving all extremities  EXTREMITIES:  2+ Peripheral Pulses, No clubbing, cyanosis, or edema  SKIN: warm, dry, normal color, no rash or abnormal lesions        HOSPITAL MEDICATIONS:  MEDICATIONS  (STANDING):  insulin regular Infusion 6 Unit(s)/Hr (6 mL/Hr) IV Continuous <Continuous>  potassium chloride  20 mEq/100 mL IVPB 20 milliEquivalent(s) IV Intermittent once    MEDICATIONS  (PRN):      LABS:                        12.9   18.91 )-----------( 393      ( 2019 16:27 )             39.0         131<L>  |  92<L>  |  13  ----------------------------<  442<H>  4.2   |  16<L>  |  0.92    Ca    10.3      2019 16:27    TPro  8.6<H>  /  Alb  3.7  /  TBili  0.5  /  DBili  x   /  AST  6   /  ALT  6   /  AlkPhos  103  27    PT/INR - ( 2019 16:53 )   PT: 12.6 SEC;   INR: 1.10          PTT - ( 2019 16:53 )  PTT:35.2 SEC  Urinalysis Basic - ( 2019 16:58 )    Color: LIGHT YELLOW / Appearance: CLEAR / S.018 / pH: 5.5  Gluc: >1000 / Ketone: >150  / Bili: NEGATIVE / Urobili: NORMAL   Blood: MODERATE / Protein: 20 / Nitrite: NEGATIVE   Leuk Esterase: SMALL / RBC: 0-2 / WBC 11-25   Sq Epi: OCC / Non Sq Epi: x / Bacteria: NEGATIVE        Venous Blood Gas:   @ 18:50  7.34/46/32/22/52.3  VBG Lactate: 0.9  Venous Blood Gas:   @ 16:27  7.26/51/17//23.6  VBG Lactate: 2.3      MICROBIOLOGY:     RADIOLOGY:  [ ] Reviewed and interpreted by me    EKG:

## 2019-07-27 NOTE — H&P ADULT - HISTORY OF PRESENT ILLNESS
Ms. June is a 49 yo woman w/ PMH of T1DM, R eye infection, kidney stone, right ovarian cyst s/p salpingooophorectomy 7/22/19 presenting with flank pain and fever at home. Patient states that ever since Wednesday she has been having L sided flank pain radiating to the front that was similar to when she had ovarian torsion. Pain comes and goes and it is 10/10 in intensity, has progressively gotten worse. She endorses nausea and vomiting, as well as fever/chills. Oral home temperature measured to 101 F on day of admission. She denies any dysuria, increased frequency, hematuria, as well as any vaginal discharge but does have spotting.     In the ED, vitals were T 100.5F, HR 94, /80, RR 14, O2 100% on RA. She was found to be in DKA, w/ glucose was 439, AG 23, beta-hydroxybutyrate 6.1, lactate 2.3. UA revealed moderate blood, negative bacteria, small LE, 11-25 WBCs. She was placed on an insulin gtt, given Zofran and Tylenol, and 2L NS. She was also started on Zosyn.    CT A/P showed heterogenous enhancement of kidneys w/ perinephric fat stranding, but no abscess or other acute intraabdominal process. Ms. June is a 49 yo woman w/ PMH of T1DM, R eye enucleation, kidney stone, right ovarian cyst s/p salpingooophorectomy 7/22/19 presenting with flank pain and fever at home. Patient states that ever since Wednesday she has been having L sided flank pain radiating to the front that was similar to when she had ovarian torsion. Pain comes and goes and it is 10/10 in intensity, has progressively gotten worse. She endorses nausea and vomiting, as well as fever/chills. Oral home temperature measured to 101 F on day of admission. She denies any dysuria, increased frequency, hematuria, as well as any vaginal discharge but does have spotting.     In the ED, vitals were T 100.5F, HR 94, /80, RR 14, O2 100% on RA. She was found to be in DKA, w/ glucose was 439, AG 23, beta-hydroxybutyrate 6.1, lactate 2.3. UA revealed moderate blood, negative bacteria, small LE, 11-25 WBCs. She was placed on an insulin gtt, given Zofran and Tylenol, and 2L NS. She was also started on Zosyn.    CT A/P showed heterogenous enhancement of kidneys w/ perinephric fat stranding, but no abscess or other acute intraabdominal process. Ms. June is a 49 yo woman w/ PMH of T1DM, R eye enucleation, kidney stone, right ovarian cyst s/p salpingooophorectomy 7/22/19 presenting with flank pain and fever at home. Patient states that ever since Wednesday she has been having L sided flank pain radiating to the front and into her groin that was similar to when she had ovarian torsion. Pain initially intermittent, but now near constant and 10/10 in intensity. She endorses nausea and vomiting, as well as fever/chills. Oral home temperature measured to 101 F on day of admission. She denies any dysuria, increased frequency, or hematuria, as well as any vaginal discharge but does have spotting.     In the ED, vitals were T 100.5F, HR 94, /80, RR 14, O2 100% on RA. She was found to be in DKA, w/ glucose was 439, AG 23, beta-hydroxybutyrate 6.1, lactate 2.3. UA revealed moderate blood, negative bacteria, small LE, 11-25 WBCs. She was placed on an insulin gtt, given Zofran and Tylenol, and 2L NS. She was also started on Zosyn.    CT A/P showed heterogenous enhancement of kidneys w/ perinephric fat stranding, but no abscess or other acute intraabdominal process.

## 2019-07-27 NOTE — ED ADULT NURSE REASSESSMENT NOTE - NS ED NURSE REASSESS COMMENT FT1
Patient rc'd from day RN. Patient complains of severe pain, medicated as ordered at 2045. Insulin infusing, IV's in tact. 2112 Patient appears more comfortable, reports still having a lot of pain, vitals as noted. Repeat FS complete, MD made aware of VS & FS. Patient stable at this time, pending CT result & dispo. Will continue to monitor
pt aox3. pt reports improvement in pain. pt in dka, to be started on insulin drip. 2nd line started, 20g iv insert to left ac

## 2019-07-27 NOTE — H&P ADULT - NSICDXPASTSURGICALHX_GEN_ALL_CORE_FT
PAST SURGICAL HISTORY:   delivery delivered     H/O  section     History of eye enucleation right    History of salpingoophorectomy     Prosthetic eye globe PAST SURGICAL HISTORY:   delivery delivered     H/O  section     History of eye enucleation right    History of salpingoophorectomy Right-sided    Prosthetic eye globe

## 2019-07-27 NOTE — ED PROVIDER NOTE - ATTENDING CONTRIBUTION TO CARE
I have personally performed a face to face bedside history and physical examination of this patient. I have discussed the history, examination, review of systems, assessment and plan of management with the resident. I have reviewed the electronic medical record and amended it to reflect my history, review of systems, physical exam, assessment and plan.    48F, pmh of DM, right ovarian cyst with recent right salpingo-oophorectomy (7/22/19) presents with LLQ and left flank pain.  Also reports sob, no cp.  Had fever last night, no dysuria.  Denies erythema or discharge from laparoscopy sites.  Tachy, febrile on arrival.  Non-toxic appearing, +LLQ tenderness without r/g, +left cvat, abdomen soft, laparoscopy sites well healed, lungs clear.  Unclear etiology of sx at this time, possible intra-abdominal abscess vs. renal colic (patient reports history).  No c/f PE at this time.  Will send labs, ct, gyn c/s.  Sepsis pathway.  Dispo pending.

## 2019-07-27 NOTE — ED ADULT NURSE NOTE - ED STAT RN HANDOFF DETAILS
2323 Insulin drip changed to 3units/hour as per MICU resident, accepting MICU RN notified, no distress

## 2019-07-27 NOTE — H&P ADULT - NSHPREVIEWOFSYSTEMS_GEN_ALL_CORE
REVIEW OF SYSTEMS:  CONSTITUTIONAL: +fever/chills,   EYES: No eye pain, visual disturbances, or discharge  ENMT:  No difficulty hearing, tinnitus, vertigo; No sinus or throat pain  NECK: No pain or stiffness  BREASTS: No pain, masses, or nipple discharge  RESPIRATORY: No cough, wheezing, or hemoptysis; No shortness of breath  CARDIOVASCULAR: No chest pain, palpitations, dizziness, or leg swelling  GASTROINTESTINAL: +nausea/vomiting and left flank pain, no melena or hematochezia.  GENITOURINARY: No dysuria, frequency, hematuria, or incontinence  NEUROLOGICAL: No headaches, memory loss, loss of strength, numbness, or tremors  SKIN: No itching, burning, rashes, or lesions   LYMPH NODES: No enlarged glands  ENDOCRINE: No heat or cold intolerance; No hair loss  MUSCULOSKELETAL: No joint pain or swelling; No muscle, back, or extremity pain  PSYCHIATRIC: No depression, anxiety, mood swings, or difficulty sleeping  HEME/LYMPH: No easy bruising, or bleeding gums  ALLERGY AND IMMUNOLOGIC: No hives or eczema

## 2019-07-27 NOTE — ED PROVIDER NOTE - OBJECTIVE STATEMENT
48F, pmh of DM, right ovarian cyst with recent right salpingo-oophorectomy (7/22/19) presenting with back pain and fever. patient reports back pain is left sided, radiates to left abdomen and feels the same as when she had to have the right ovary removed. pain is described as "grabbing" and diminishes and increases. had fever yesterday to 101 at home. had nausea and vomiting. no pain or burning with urination, chest pain or pain or swelling of lower extremities. noted some brown vaginal discharge.

## 2019-07-27 NOTE — H&P ADULT - NSHPLABSRESULTS_GEN_ALL_CORE
12.9   18.91 )-----------( 393      ( 2019 16:27 )             39.0         137  |  100  |  9   ----------------------------<  219<H>  3.7   |  20<L>  |  0.81    Ca    9.1      2019 22:10    TPro  8.6<H>  /  Alb  3.7  /  TBili  0.5  /  DBili  x   /  AST  6   /  ALT  6   /  AlkPhos  103          LIVER FUNCTIONS - ( 2019 16:27 )  Alb: 3.7 g/dL / Pro: 8.6 g/dL / ALK PHOS: 103 u/L / ALT: 6 u/L / AST: 6 u/L / GGT: x           PT/INR - ( 2019 16:53 )   PT: 12.6 SEC;   INR: 1.10          PTT - ( 2019 16:53 )  PTT:35.2 SEC  Urinalysis Basic - ( 2019 16:58 )    Color: LIGHT YELLOW / Appearance: CLEAR / S.018 / pH: 5.5  Gluc: >1000 / Ketone: >150  / Bili: NEGATIVE / Urobili: NORMAL   Blood: MODERATE / Protein: 20 / Nitrite: NEGATIVE   Leuk Esterase: SMALL / RBC: 0-2 / WBC 11-25   Sq Epi: OCC / Non Sq Epi: x / Bacteria: NEGATIVE        EKG:     RADIOLOGY STUDIES: 12.9   18.91 )-----------( 393      ( 2019 16:27 )             39.0     07-    137  |  100  |  9   ----------------------------<  219<H>  3.7   |  20<L>  |  0.81    Ca    9.1      2019 22:10    TPro  8.6<H>  /  Alb  3.7  /  TBili  0.5  /  DBili  x   /  AST  6   /  ALT  6   /  AlkPhos  103          LIVER FUNCTIONS - ( 2019 16:27 )  Alb: 3.7 g/dL / Pro: 8.6 g/dL / ALK PHOS: 103 u/L / ALT: 6 u/L / AST: 6 u/L / GGT: x           PT/INR - ( 2019 16:53 )   PT: 12.6 SEC;   INR: 1.10          PTT - ( 2019 16:53 )  PTT:35.2 SEC  Urinalysis Basic - ( 2019 16:58 )    Color: LIGHT YELLOW / Appearance: CLEAR / S.018 / pH: 5.5  Gluc: >1000 / Ketone: >150  / Bili: NEGATIVE / Urobili: NORMAL   Blood: MODERATE / Protein: 20 / Nitrite: NEGATIVE   Leuk Esterase: SMALL / RBC: 0-2 / WBC 11-25   Sq Epi: OCC / Non Sq Epi: x / Bacteria: NEGATIVE        EKG: NSR, QTc 411    RADIOLOGY STUDIES:    CT A/P    IMPRESSION:  No intra-abdominal abscess.    Status post right salpingo-oophorectomy. 2.6 x 2.6 cm heterogeneously   rounded dense structure in the right adnexa with small pocket of adjacent   air is likely postsurgical; question hematoma.    Thickening of the urinary bladder walls despite underdistention raises   question of cystitis. Heterogeneous enhancement of the kidneys and mild   perinephric/perineural fat stranding raises concern for ascending urinary   tract infection and itzel nephritis. Correlate with urinalysis and urine   culture.    Diminished enhancement of the right ovarian vein as it approaches the   right adnexa which may be postsurgical

## 2019-07-27 NOTE — CONSULT NOTE ADULT - ASSESSMENT
A/P: 47 yo female POD#5 s/p lsc RSO for presumed torsion of 14cm ovarian cyst, found to be adherent ureterosacral ligament and broad ligament intraop, requiring extensive dissection. Patient presents POD#5 with left flank pain and fever to 38.1 in ED with poor diabetic control. WBC 18, increased from 9.5 on discharge.   - Rule out DKA   - Will f/u CT for further recommendations on dispo      Esra Miriamirel PGY2  to be discussed with attending

## 2019-07-27 NOTE — CONSULT NOTE ADULT - ASSESSMENT
48F, PMH of DM1, R eye infection, kidney stone, right ovarian cyst s/p salpingooophorectomy 7/22 presenting with flank pain and fever at home coming in the setting of sepsis. She is pending CT scan to assess for intra-abdominal infection, now in DKA on insulin drip. MICU was consulted in the setting of DKA.     #DKA  - patient currently on insulin drip  - continue on IVF supplementation  - trend and replete electrolytes  - management per primary team  - f/u CT a/p to assess for intra-abdominal infection  - consider SICU evaluation     d/w Dr. Simpson

## 2019-07-27 NOTE — ED ADULT TRIAGE NOTE - CHIEF COMPLAINT QUOTE
Pt. c/o left lower back pain radiating to abdomen with n/v and fever (tmax 101) since Tuesday. States she had right oophorectomy on Monday due to large cyst. Some relief with Motrin. Appears very uncomfortable in triage.  in triage.

## 2019-07-27 NOTE — ED PROVIDER NOTE - PROGRESS NOTE DETAILS
OB consulted. will see patient in ED. - resident Ralph Musa VANNESA:  Labs c/w DKA.  Will start insulin drip, LR.  She has good mental status, appears non-toxic. VANNESA:  Labs c/w DKA.  Will start insulin drip, LR, potassium supplementation.  She has good mental status, appears non-toxic. micu consulted. will see patient although if admitted and needs ICU level of care would likely go to SICU. - resident Ralph Musa

## 2019-07-27 NOTE — CONSULT NOTE ADULT - SUBJECTIVE AND OBJECTIVE BOX
47 yo , POD#5 s/p lsc right salpingooophorectomy () for presumed torsion with 14cm cyst, likely endometrioma. Patient returns POD#5 with worsening left flank pain, wrapping around to LLQ, nausea/vomiting, fevers, chills. She measured her temp at 101 at home today. She also reports mild SOB, but denies CP. She denies dysuria, hematuria, or frequency. She reports constipation with last BM 3 days ago. She reports scant brown vaginal discharge since her surgery. PMHx sig for type I DM, with  in ED.     POBhx   -    - pLTCS  for NRFHT   - SABx 2 (, )     PGynhx   - R ovarian cyst, likely endometrioma  - RSO 19   - denies known h/o fibroids, AUB, abnl Paps, STIs    PMHx - type I DM, bacteremia in 10/2015   PSHx - Lsc RSO (19)   Meds - Admelog , Basaglar 12u QHS  Allergies - NKDA   Social - denies tobacco, alcohol, drugs    Physical Exam:   General: sitting comfortably in bed, NAD   Neck: supple, full ROM, no lymphadenopathy  CV: RRR S1S2  Lungs: CTAB  Back: Left CVA tenderness  Abd: Soft, NTND, incisions c/d/i  Vaginal: no vaginal bleeding, scant brown vaginal discharge, no CMT  Ext: NT b/l, no edema    Vital Signs Last 24 Hrs  T(C): 38.1 (2019 16:37), Max: 38.1 (2019 16:37)  T(F): 100.5 (2019 16:37), Max: 100.5 (2019 16:37)  HR: 94 (2019 16:37) (94 - 107)  BP: 149/80 (2019 16:37) (141/78 - 149/80)  BP(mean): --  RR: 14 (2019 16:37) (14 - 20)  SpO2: 100% (2019 16:37) (100% - 100%)    I&O's Detail                            12.9   18.91 )-----------( 393      ( 2019 16:27 )             39.0           131<L>  |  92<L>  |  13  ----------------------------<  442<H>  4.2   |  16<L>  |  0.92    Ca    10.3      2019 16:27    TPro  8.6<H>  /  Alb  3.7  /  TBili  0.5  /  DBili  x   /  AST  6   /  ALT  6   /  AlkPhos  103      POCT Blood Glucose.: 335 mg/dL (2019 19:06)  POCT Blood Glucose.: 439 mg/dL (2019 15:25)      LIVER FUNCTIONS - ( 2019 16:27 )  Alb: 3.7 g/dL / Pro: 8.6 g/dL / ALK PHOS: 103 u/L / ALT: 6 u/L / AST: 6 u/L / GGT: x             PT/INR - ( 2019 16:53 )   PT: 12.6 SEC;   INR: 1.10          PTT - ( 2019 16:53 )  PTT:35.2 SEC    Urinalysis Basic - ( 2019 16:58 )    Color: LIGHT YELLOW / Appearance: CLEAR / S.018 / pH: 5.5  Gluc: >1000 / Ketone: >150  / Bili: NEGATIVE / Urobili: NORMAL   Blood: MODERATE / Protein: 20 / Nitrite: NEGATIVE   Leuk Esterase: SMALL / RBC: 0-2 / WBC 11-25   Sq Epi: OCC / Non Sq Epi: x / Bacteria: NEGATIVE

## 2019-07-27 NOTE — ED PROVIDER NOTE - CLINICAL SUMMARY MEDICAL DECISION MAKING FREE TEXT BOX
48F presenting with back/abdominal pain. symptoms are the same as previous admission for oophorectomy. febrile. concern for torsion and sepsis. plan for labs, transvaginal u/s, OB consult. will admit.

## 2019-07-27 NOTE — ED ADULT NURSE NOTE - OBJECTIVE STATEMENT
47 yo female, ambulatory presenting to ED s/p  right oophorectomy on Monday due to large cyst. pt had laparoscopic surgery with sites intact, no redness, no warmth, no drainage. Pt c/o pain to LLQ with radiation to back, which pt states is same pain that caused her to need surgery on the right side. pt is febrile, tachycardic (MD Musa aware of VS). pt reports sob with pain and that she vomited this morning. pt reports minimal brown vaginal discharge. Pt denies headache, chest pain, dysuria. pt placed on CM. 20g iv insert to right wrist. labs sent as ordered.

## 2019-07-28 DIAGNOSIS — E10.10 TYPE 1 DIABETES MELLITUS WITH KETOACIDOSIS WITHOUT COMA: ICD-10-CM

## 2019-07-28 DIAGNOSIS — Z90.79 ACQUIRED ABSENCE OF OTHER GENITAL ORGAN(S): Chronic | ICD-10-CM

## 2019-07-28 LAB
ANION GAP SERPL CALC-SCNC: 10 MMO/L — SIGNIFICANT CHANGE UP (ref 7–14)
ANION GAP SERPL CALC-SCNC: 13 MMO/L — SIGNIFICANT CHANGE UP (ref 7–14)
ANION GAP SERPL CALC-SCNC: 15 MMO/L — HIGH (ref 7–14)
ANION GAP SERPL CALC-SCNC: 15 MMO/L — HIGH (ref 7–14)
ANION GAP SERPL CALC-SCNC: 17 MMO/L — HIGH (ref 7–14)
BASE EXCESS BLDV CALC-SCNC: -2 MMOL/L — SIGNIFICANT CHANGE UP
BASE EXCESS BLDV CALC-SCNC: 0.8 MMOL/L — SIGNIFICANT CHANGE UP
BASOPHILS # BLD AUTO: 0.02 K/UL — SIGNIFICANT CHANGE UP (ref 0–0.2)
BASOPHILS NFR BLD AUTO: 0.1 % — SIGNIFICANT CHANGE UP (ref 0–2)
BLOOD GAS VENOUS - CREATININE: 0.79 MG/DL — SIGNIFICANT CHANGE UP (ref 0.5–1.3)
BLOOD GAS VENOUS - CREATININE: SIGNIFICANT CHANGE UP MG/DL (ref 0.5–1.3)
BLOOD GAS VENOUS - FIO2: 0 — SIGNIFICANT CHANGE UP
BUN SERPL-MCNC: 4 MG/DL — LOW (ref 7–23)
BUN SERPL-MCNC: 5 MG/DL — LOW (ref 7–23)
BUN SERPL-MCNC: 6 MG/DL — LOW (ref 7–23)
BUN SERPL-MCNC: 6 MG/DL — LOW (ref 7–23)
BUN SERPL-MCNC: 7 MG/DL — SIGNIFICANT CHANGE UP (ref 7–23)
CALCIUM SERPL-MCNC: 8.5 MG/DL — SIGNIFICANT CHANGE UP (ref 8.4–10.5)
CALCIUM SERPL-MCNC: 8.7 MG/DL — SIGNIFICANT CHANGE UP (ref 8.4–10.5)
CALCIUM SERPL-MCNC: 9 MG/DL — SIGNIFICANT CHANGE UP (ref 8.4–10.5)
CALCIUM SERPL-MCNC: 9.1 MG/DL — SIGNIFICANT CHANGE UP (ref 8.4–10.5)
CALCIUM SERPL-MCNC: 9.5 MG/DL — SIGNIFICANT CHANGE UP (ref 8.4–10.5)
CHLORIDE BLDV-SCNC: 106 MMOL/L — SIGNIFICANT CHANGE UP (ref 96–108)
CHLORIDE BLDV-SCNC: 108 MMOL/L — SIGNIFICANT CHANGE UP (ref 96–108)
CHLORIDE SERPL-SCNC: 101 MMOL/L — SIGNIFICANT CHANGE UP (ref 98–107)
CHLORIDE SERPL-SCNC: 101 MMOL/L — SIGNIFICANT CHANGE UP (ref 98–107)
CHLORIDE SERPL-SCNC: 103 MMOL/L — SIGNIFICANT CHANGE UP (ref 98–107)
CHLORIDE SERPL-SCNC: 104 MMOL/L — SIGNIFICANT CHANGE UP (ref 98–107)
CHLORIDE SERPL-SCNC: 99 MMOL/L — SIGNIFICANT CHANGE UP (ref 98–107)
CO2 SERPL-SCNC: 19 MMOL/L — LOW (ref 22–31)
CO2 SERPL-SCNC: 21 MMOL/L — LOW (ref 22–31)
CO2 SERPL-SCNC: 23 MMOL/L — SIGNIFICANT CHANGE UP (ref 22–31)
CO2 SERPL-SCNC: 23 MMOL/L — SIGNIFICANT CHANGE UP (ref 22–31)
CO2 SERPL-SCNC: 24 MMOL/L — SIGNIFICANT CHANGE UP (ref 22–31)
CREAT SERPL-MCNC: 0.74 MG/DL — SIGNIFICANT CHANGE UP (ref 0.5–1.3)
CREAT SERPL-MCNC: 0.75 MG/DL — SIGNIFICANT CHANGE UP (ref 0.5–1.3)
CREAT SERPL-MCNC: 0.77 MG/DL — SIGNIFICANT CHANGE UP (ref 0.5–1.3)
CREAT SERPL-MCNC: 0.78 MG/DL — SIGNIFICANT CHANGE UP (ref 0.5–1.3)
CREAT SERPL-MCNC: 0.81 MG/DL — SIGNIFICANT CHANGE UP (ref 0.5–1.3)
EOSINOPHIL # BLD AUTO: 0 K/UL — SIGNIFICANT CHANGE UP (ref 0–0.5)
EOSINOPHIL NFR BLD AUTO: 0 % — SIGNIFICANT CHANGE UP (ref 0–6)
GAS PNL BLDV: 134 MMOL/L — LOW (ref 136–146)
GAS PNL BLDV: 135 MMOL/L — LOW (ref 136–146)
GLUCOSE BLDC GLUCOMTR-MCNC: 132 MG/DL — HIGH (ref 70–99)
GLUCOSE BLDC GLUCOMTR-MCNC: 136 MG/DL — HIGH (ref 70–99)
GLUCOSE BLDC GLUCOMTR-MCNC: 143 MG/DL — HIGH (ref 70–99)
GLUCOSE BLDC GLUCOMTR-MCNC: 145 MG/DL — HIGH (ref 70–99)
GLUCOSE BLDC GLUCOMTR-MCNC: 153 MG/DL — HIGH (ref 70–99)
GLUCOSE BLDC GLUCOMTR-MCNC: 156 MG/DL — HIGH (ref 70–99)
GLUCOSE BLDC GLUCOMTR-MCNC: 159 MG/DL — HIGH (ref 70–99)
GLUCOSE BLDC GLUCOMTR-MCNC: 161 MG/DL — HIGH (ref 70–99)
GLUCOSE BLDC GLUCOMTR-MCNC: 162 MG/DL — HIGH (ref 70–99)
GLUCOSE BLDC GLUCOMTR-MCNC: 171 MG/DL — HIGH (ref 70–99)
GLUCOSE BLDC GLUCOMTR-MCNC: 171 MG/DL — HIGH (ref 70–99)
GLUCOSE BLDC GLUCOMTR-MCNC: 172 MG/DL — HIGH (ref 70–99)
GLUCOSE BLDC GLUCOMTR-MCNC: 174 MG/DL — HIGH (ref 70–99)
GLUCOSE BLDC GLUCOMTR-MCNC: 175 MG/DL — HIGH (ref 70–99)
GLUCOSE BLDC GLUCOMTR-MCNC: 183 MG/DL — HIGH (ref 70–99)
GLUCOSE BLDC GLUCOMTR-MCNC: 183 MG/DL — HIGH (ref 70–99)
GLUCOSE BLDC GLUCOMTR-MCNC: 186 MG/DL — HIGH (ref 70–99)
GLUCOSE BLDC GLUCOMTR-MCNC: 191 MG/DL — HIGH (ref 70–99)
GLUCOSE BLDC GLUCOMTR-MCNC: 192 MG/DL — HIGH (ref 70–99)
GLUCOSE BLDC GLUCOMTR-MCNC: 198 MG/DL — HIGH (ref 70–99)
GLUCOSE BLDC GLUCOMTR-MCNC: 200 MG/DL — HIGH (ref 70–99)
GLUCOSE BLDC GLUCOMTR-MCNC: 377 MG/DL — HIGH (ref 70–99)
GLUCOSE BLDC GLUCOMTR-MCNC: 95 MG/DL — SIGNIFICANT CHANGE UP (ref 70–99)
GLUCOSE BLDV-MCNC: 184 MG/DL — HIGH (ref 70–99)
GLUCOSE BLDV-MCNC: 190 MG/DL — HIGH (ref 70–99)
GLUCOSE SERPL-MCNC: 160 MG/DL — HIGH (ref 70–99)
GLUCOSE SERPL-MCNC: 170 MG/DL — HIGH (ref 70–99)
GLUCOSE SERPL-MCNC: 181 MG/DL — HIGH (ref 70–99)
GLUCOSE SERPL-MCNC: 182 MG/DL — HIGH (ref 70–99)
GLUCOSE SERPL-MCNC: 207 MG/DL — HIGH (ref 70–99)
HCO3 BLDV-SCNC: 23 MMOL/L — SIGNIFICANT CHANGE UP (ref 20–27)
HCO3 BLDV-SCNC: 25 MMOL/L — SIGNIFICANT CHANGE UP (ref 20–27)
HCT VFR BLD CALC: 34.5 % — SIGNIFICANT CHANGE UP (ref 34.5–45)
HCT VFR BLDV CALC: 34.5 % — SIGNIFICANT CHANGE UP (ref 34.5–45)
HCT VFR BLDV CALC: 35.5 % — SIGNIFICANT CHANGE UP (ref 34.5–45)
HGB BLD-MCNC: 11.3 G/DL — LOW (ref 11.5–15.5)
HGB BLDV-MCNC: 11.2 G/DL — LOW (ref 11.5–15.5)
HGB BLDV-MCNC: 11.5 G/DL — SIGNIFICANT CHANGE UP (ref 11.5–15.5)
IMM GRANULOCYTES NFR BLD AUTO: 0.4 % — SIGNIFICANT CHANGE UP (ref 0–1.5)
LACTATE BLDV-MCNC: 1 MMOL/L — SIGNIFICANT CHANGE UP (ref 0.5–2)
LACTATE BLDV-MCNC: 1.4 MMOL/L — SIGNIFICANT CHANGE UP (ref 0.5–2)
LYMPHOCYTES # BLD AUTO: 0.94 K/UL — LOW (ref 1–3.3)
LYMPHOCYTES # BLD AUTO: 5.3 % — LOW (ref 13–44)
MAGNESIUM SERPL-MCNC: 1.6 MG/DL — SIGNIFICANT CHANGE UP (ref 1.6–2.6)
MAGNESIUM SERPL-MCNC: 1.7 MG/DL — SIGNIFICANT CHANGE UP (ref 1.6–2.6)
MAGNESIUM SERPL-MCNC: 1.9 MG/DL — SIGNIFICANT CHANGE UP (ref 1.6–2.6)
MCHC RBC-ENTMCNC: 28.3 PG — SIGNIFICANT CHANGE UP (ref 27–34)
MCHC RBC-ENTMCNC: 32.8 % — SIGNIFICANT CHANGE UP (ref 32–36)
MCV RBC AUTO: 86.3 FL — SIGNIFICANT CHANGE UP (ref 80–100)
MONOCYTES # BLD AUTO: 1.42 K/UL — HIGH (ref 0–0.9)
MONOCYTES NFR BLD AUTO: 8.1 % — SIGNIFICANT CHANGE UP (ref 2–14)
NEUTROPHILS # BLD AUTO: 15.18 K/UL — HIGH (ref 1.8–7.4)
NEUTROPHILS NFR BLD AUTO: 86.1 % — HIGH (ref 43–77)
NRBC # FLD: 0 K/UL — SIGNIFICANT CHANGE UP (ref 0–0)
PCO2 BLDV: 39 MMHG — LOW (ref 41–51)
PCO2 BLDV: 43 MMHG — SIGNIFICANT CHANGE UP (ref 41–51)
PH BLDV: 7.38 PH — SIGNIFICANT CHANGE UP (ref 7.32–7.43)
PH BLDV: 7.39 PH — SIGNIFICANT CHANGE UP (ref 7.32–7.43)
PHOSPHATE SERPL-MCNC: 1.5 MG/DL — LOW (ref 2.5–4.5)
PHOSPHATE SERPL-MCNC: 1.9 MG/DL — LOW (ref 2.5–4.5)
PHOSPHATE SERPL-MCNC: 2.2 MG/DL — LOW (ref 2.5–4.5)
PHOSPHATE SERPL-MCNC: 2.3 MG/DL — LOW (ref 2.5–4.5)
PHOSPHATE SERPL-MCNC: 2.4 MG/DL — LOW (ref 2.5–4.5)
PLATELET # BLD AUTO: 382 K/UL — SIGNIFICANT CHANGE UP (ref 150–400)
PMV BLD: 11.4 FL — SIGNIFICANT CHANGE UP (ref 7–13)
PO2 BLDV: 163 MMHG — HIGH (ref 35–40)
PO2 BLDV: 42 MMHG — HIGH (ref 35–40)
POTASSIUM BLDV-SCNC: 3.4 MMOL/L — SIGNIFICANT CHANGE UP (ref 3.4–4.5)
POTASSIUM BLDV-SCNC: 3.5 MMOL/L — SIGNIFICANT CHANGE UP (ref 3.4–4.5)
POTASSIUM SERPL-MCNC: 3.3 MMOL/L — LOW (ref 3.5–5.3)
POTASSIUM SERPL-MCNC: 3.7 MMOL/L — SIGNIFICANT CHANGE UP (ref 3.5–5.3)
POTASSIUM SERPL-MCNC: 3.8 MMOL/L — SIGNIFICANT CHANGE UP (ref 3.5–5.3)
POTASSIUM SERPL-SCNC: 3.3 MMOL/L — LOW (ref 3.5–5.3)
POTASSIUM SERPL-SCNC: 3.7 MMOL/L — SIGNIFICANT CHANGE UP (ref 3.5–5.3)
POTASSIUM SERPL-SCNC: 3.8 MMOL/L — SIGNIFICANT CHANGE UP (ref 3.5–5.3)
RBC # BLD: 4 M/UL — SIGNIFICANT CHANGE UP (ref 3.8–5.2)
RBC # FLD: 11.8 % — SIGNIFICANT CHANGE UP (ref 10.3–14.5)
SAO2 % BLDV: 81.5 % — SIGNIFICANT CHANGE UP (ref 60–85)
SAO2 % BLDV: 99.5 % — HIGH (ref 60–85)
SODIUM SERPL-SCNC: 136 MMOL/L — SIGNIFICANT CHANGE UP (ref 135–145)
SODIUM SERPL-SCNC: 137 MMOL/L — SIGNIFICANT CHANGE UP (ref 135–145)
SODIUM SERPL-SCNC: 137 MMOL/L — SIGNIFICANT CHANGE UP (ref 135–145)
SODIUM SERPL-SCNC: 139 MMOL/L — SIGNIFICANT CHANGE UP (ref 135–145)
SODIUM SERPL-SCNC: 139 MMOL/L — SIGNIFICANT CHANGE UP (ref 135–145)
SPECIMEN SOURCE: SIGNIFICANT CHANGE UP
SPECIMEN SOURCE: SIGNIFICANT CHANGE UP
WBC # BLD: 17.63 K/UL — HIGH (ref 3.8–10.5)
WBC # FLD AUTO: 17.63 K/UL — HIGH (ref 3.8–10.5)

## 2019-07-28 PROCEDURE — 99223 1ST HOSP IP/OBS HIGH 75: CPT

## 2019-07-28 PROCEDURE — 99291 CRITICAL CARE FIRST HOUR: CPT

## 2019-07-28 PROCEDURE — 74178 CT ABD&PLV WO CNTR FLWD CNTR: CPT | Mod: 26

## 2019-07-28 RX ORDER — INSULIN HUMAN 100 [IU]/ML
5 INJECTION, SOLUTION SUBCUTANEOUS
Qty: 100 | Refills: 0 | Status: DISCONTINUED | OUTPATIENT
Start: 2019-07-28 | End: 2019-07-28

## 2019-07-28 RX ORDER — SIMETHICONE 80 MG/1
80 TABLET, CHEWABLE ORAL EVERY 12 HOURS
Refills: 0 | Status: COMPLETED | OUTPATIENT
Start: 2019-07-28 | End: 2019-07-31

## 2019-07-28 RX ORDER — SODIUM CHLORIDE 9 MG/ML
1000 INJECTION, SOLUTION INTRAVENOUS
Refills: 0 | Status: DISCONTINUED | OUTPATIENT
Start: 2019-07-28 | End: 2019-07-28

## 2019-07-28 RX ORDER — ACETAMINOPHEN 500 MG
650 TABLET ORAL EVERY 6 HOURS
Refills: 0 | Status: DISCONTINUED | OUTPATIENT
Start: 2019-07-28 | End: 2019-08-03

## 2019-07-28 RX ORDER — INSULIN LISPRO 100/ML
5 VIAL (ML) SUBCUTANEOUS
Refills: 0 | Status: DISCONTINUED | OUTPATIENT
Start: 2019-07-28 | End: 2019-07-29

## 2019-07-28 RX ORDER — INSULIN LISPRO 100/ML
VIAL (ML) SUBCUTANEOUS AT BEDTIME
Refills: 0 | Status: DISCONTINUED | OUTPATIENT
Start: 2019-07-28 | End: 2019-08-03

## 2019-07-28 RX ORDER — HEPARIN SODIUM 5000 [USP'U]/ML
5000 INJECTION INTRAVENOUS; SUBCUTANEOUS EVERY 8 HOURS
Refills: 0 | Status: DISCONTINUED | OUTPATIENT
Start: 2019-07-28 | End: 2019-08-03

## 2019-07-28 RX ORDER — SIMETHICONE 80 MG/1
80 TABLET, CHEWABLE ORAL ONCE
Refills: 0 | Status: COMPLETED | OUTPATIENT
Start: 2019-07-28 | End: 2019-07-28

## 2019-07-28 RX ORDER — INSULIN LISPRO 100/ML
VIAL (ML) SUBCUTANEOUS
Refills: 0 | Status: DISCONTINUED | OUTPATIENT
Start: 2019-07-28 | End: 2019-08-03

## 2019-07-28 RX ORDER — ONDANSETRON 8 MG/1
4 TABLET, FILM COATED ORAL EVERY 6 HOURS
Refills: 0 | Status: DISCONTINUED | OUTPATIENT
Start: 2019-07-28 | End: 2019-08-03

## 2019-07-28 RX ORDER — INSULIN GLARGINE 100 [IU]/ML
16 INJECTION, SOLUTION SUBCUTANEOUS AT BEDTIME
Refills: 0 | Status: DISCONTINUED | OUTPATIENT
Start: 2019-07-28 | End: 2019-07-29

## 2019-07-28 RX ORDER — HYDROMORPHONE HYDROCHLORIDE 2 MG/ML
1 INJECTION INTRAMUSCULAR; INTRAVENOUS; SUBCUTANEOUS EVERY 4 HOURS
Refills: 0 | Status: DISCONTINUED | OUTPATIENT
Start: 2019-07-28 | End: 2019-07-29

## 2019-07-28 RX ORDER — POTASSIUM CHLORIDE 20 MEQ
40 PACKET (EA) ORAL ONCE
Refills: 0 | Status: COMPLETED | OUTPATIENT
Start: 2019-07-28 | End: 2019-07-28

## 2019-07-28 RX ADMIN — HYDROMORPHONE HYDROCHLORIDE 1 MILLIGRAM(S): 2 INJECTION INTRAMUSCULAR; INTRAVENOUS; SUBCUTANEOUS at 11:30

## 2019-07-28 RX ADMIN — PIPERACILLIN AND TAZOBACTAM 25 GRAM(S): 4; .5 INJECTION, POWDER, LYOPHILIZED, FOR SOLUTION INTRAVENOUS at 16:18

## 2019-07-28 RX ADMIN — HEPARIN SODIUM 5000 UNIT(S): 5000 INJECTION INTRAVENOUS; SUBCUTANEOUS at 15:03

## 2019-07-28 RX ADMIN — HYDROMORPHONE HYDROCHLORIDE 1 MILLIGRAM(S): 2 INJECTION INTRAMUSCULAR; INTRAVENOUS; SUBCUTANEOUS at 05:54

## 2019-07-28 RX ADMIN — INSULIN GLARGINE 16 UNIT(S): 100 INJECTION, SOLUTION SUBCUTANEOUS at 21:19

## 2019-07-28 RX ADMIN — HYDROMORPHONE HYDROCHLORIDE 1 MILLIGRAM(S): 2 INJECTION INTRAMUSCULAR; INTRAVENOUS; SUBCUTANEOUS at 05:10

## 2019-07-28 RX ADMIN — SIMETHICONE 80 MILLIGRAM(S): 80 TABLET, CHEWABLE ORAL at 20:14

## 2019-07-28 RX ADMIN — Medication 40 MILLIEQUIVALENT(S): at 20:00

## 2019-07-28 RX ADMIN — PIPERACILLIN AND TAZOBACTAM 25 GRAM(S): 4; .5 INJECTION, POWDER, LYOPHILIZED, FOR SOLUTION INTRAVENOUS at 08:00

## 2019-07-28 RX ADMIN — ONDANSETRON 4 MILLIGRAM(S): 8 TABLET, FILM COATED ORAL at 08:00

## 2019-07-28 RX ADMIN — HYDROMORPHONE HYDROCHLORIDE 1 MILLIGRAM(S): 2 INJECTION INTRAMUSCULAR; INTRAVENOUS; SUBCUTANEOUS at 14:45

## 2019-07-28 RX ADMIN — HYDROMORPHONE HYDROCHLORIDE 1 MILLIGRAM(S): 2 INJECTION INTRAMUSCULAR; INTRAVENOUS; SUBCUTANEOUS at 11:08

## 2019-07-28 RX ADMIN — HYDROMORPHONE HYDROCHLORIDE 1 MILLIGRAM(S): 2 INJECTION INTRAMUSCULAR; INTRAVENOUS; SUBCUTANEOUS at 00:19

## 2019-07-28 RX ADMIN — PIPERACILLIN AND TAZOBACTAM 25 GRAM(S): 4; .5 INJECTION, POWDER, LYOPHILIZED, FOR SOLUTION INTRAVENOUS at 00:19

## 2019-07-28 RX ADMIN — Medication 85 MILLIMOLE(S): at 14:45

## 2019-07-28 RX ADMIN — ONDANSETRON 4 MILLIGRAM(S): 8 TABLET, FILM COATED ORAL at 20:00

## 2019-07-28 RX ADMIN — Medication 40 MILLIEQUIVALENT(S): at 05:11

## 2019-07-28 RX ADMIN — HEPARIN SODIUM 5000 UNIT(S): 5000 INJECTION INTRAVENOUS; SUBCUTANEOUS at 05:11

## 2019-07-28 RX ADMIN — HYDROMORPHONE HYDROCHLORIDE 1 MILLIGRAM(S): 2 INJECTION INTRAMUSCULAR; INTRAVENOUS; SUBCUTANEOUS at 21:10

## 2019-07-28 RX ADMIN — HYDROMORPHONE HYDROCHLORIDE 1 MILLIGRAM(S): 2 INJECTION INTRAMUSCULAR; INTRAVENOUS; SUBCUTANEOUS at 15:00

## 2019-07-28 RX ADMIN — CHLORHEXIDINE GLUCONATE 1 APPLICATION(S): 213 SOLUTION TOPICAL at 14:45

## 2019-07-28 RX ADMIN — HEPARIN SODIUM 5000 UNIT(S): 5000 INJECTION INTRAVENOUS; SUBCUTANEOUS at 22:18

## 2019-07-28 RX ADMIN — HYDROMORPHONE HYDROCHLORIDE 1 MILLIGRAM(S): 2 INJECTION INTRAMUSCULAR; INTRAVENOUS; SUBCUTANEOUS at 20:58

## 2019-07-28 RX ADMIN — ONDANSETRON 4 MILLIGRAM(S): 8 TABLET, FILM COATED ORAL at 14:59

## 2019-07-28 RX ADMIN — SIMETHICONE 80 MILLIGRAM(S): 80 TABLET, CHEWABLE ORAL at 13:18

## 2019-07-28 RX ADMIN — SODIUM CHLORIDE 150 MILLILITER(S): 9 INJECTION, SOLUTION INTRAVENOUS at 14:59

## 2019-07-28 RX ADMIN — HYDROMORPHONE HYDROCHLORIDE 1 MILLIGRAM(S): 2 INJECTION INTRAMUSCULAR; INTRAVENOUS; SUBCUTANEOUS at 03:51

## 2019-07-28 NOTE — CONSULT NOTE ADULT - PROBLEM SELECTOR RECOMMENDATION 9
-patient presents with DKA in the setting of flank pain. A1c of 9.6% and some missed doses of insulin  -continue with insulin gtt per protocol. BMP this morning, shows improved bicarb mild anion gap remains.  Titrate drip to 4 units/hour and monitor FS.  If sugars are well controlled, titrate drip down to 2 units/hour, repeat BMP  -if no anion gap and the patient's FS are well controlled in the 100-150s, transition to Lantus 16.  Discontinue the drip two hours after lantus and if the patient is eating start humalog 5 units three times a day before meals with a low correction sliding scale  -consistent carbohydrate diet  -hypoglycemia protocol  -follow up with [facility] endocrinology. Endocrine will continue to follow

## 2019-07-28 NOTE — CHART NOTE - NSCHARTNOTEFT_GEN_A_CORE
MICU Transfer Note    Transfer from: MICU  Transfer to:  (  ) Medicine    (  ) Telemetry    (  ) RCU    (  ) Palliative    (  ) Stroke Unit    (  ) _______________  Accepting physican:      MICU COURSE:    49 yo F Hx T1DM, R eye enucleation, kidney stone, R ovarian cyst s/p salpingoophorectomy on 7/22/19 presented w/ L flank pain radiating to groin, N/V and fever/chills.  In ED, patient found to be in DKA and had positive U/A.  Patient transferred to MICU for management of DKA.    In MICU, patient started on insulin gtt and zosyn (for possible pyelonephritis based on CT).       ASSESSMENT & PLAN:         For Follow-Up:          Vital Signs Last 24 Hrs  T(C): 37.2 (28 Jul 2019 12:00), Max: 38.1 (27 Jul 2019 16:37)  T(F): 99 (28 Jul 2019 12:00), Max: 100.5 (27 Jul 2019 16:37)  HR: 90 (28 Jul 2019 13:00) (72 - 96)  BP: 144/61 (28 Jul 2019 13:00) (113/99 - 170/66)  BP(mean): 86 (28 Jul 2019 13:00) (75 - 114)  RR: 14 (28 Jul 2019 13:00) (13 - 23)  SpO2: 97% (28 Jul 2019 13:00) (95% - 100%)  I&O's Summary    27 Jul 2019 07:01  -  28 Jul 2019 07:00  --------------------------------------------------------  IN: 956 mL / OUT: 800 mL / NET: 156 mL    28 Jul 2019 07:01  -  28 Jul 2019 16:34  --------------------------------------------------------  IN: 973 mL / OUT: 400 mL / NET: 573 mL          MEDICATIONS  (STANDING):  chlorhexidine 4% Liquid 1 Application(s) Topical <User Schedule>  dextrose 5% + sodium chloride 0.45%. 1000 milliLiter(s) (150 mL/Hr) IV Continuous <Continuous>  heparin  Injectable 5000 Unit(s) SubCutaneous every 8 hours  insulin regular Infusion 5 Unit(s)/Hr (5 mL/Hr) IV Continuous <Continuous>  piperacillin/tazobactam IVPB.. 3.375 Gram(s) IV Intermittent every 8 hours  simethicone 80 milliGRAM(s) Chew every 12 hours    MEDICATIONS  (PRN):  acetaminophen   Tablet .. 650 milliGRAM(s) Oral every 6 hours PRN Mild Pain (1 - 3), Moderate Pain (4 - 6)  HYDROmorphone  Injectable 1 milliGRAM(s) IV Push every 4 hours PRN Severe Pain (7 - 10)  ondansetron Injectable 4 milliGRAM(s) IV Push every 6 hours PRN Nausea and/or Vomiting        LABS                                            11.3                  Neurophils% (auto):   86.1   (07-28 @ 02:20):    17.63)-----------(382          Lymphocytes% (auto):  5.3                                           34.5                   Eosinphils% (auto):   0.0      Manual%: Neutrophils x    ; Lymphocytes x    ; Eosinophils x    ; Bands%: x    ; Blasts x                                    137    |  104    |  5                   Calcium: 8.5   / iCa: x      (07-28 @ 12:50)    ----------------------------<  170       Magnesium: 1.7                              3.8     |  23     |  0.78             Phosphorous: 1.5        ( 07-27 @ 16:53 )   PT: 12.6 SEC;   INR: 1.10   aPTT: 35.2 SEC MICU Transfer Note    Transfer from: MICU  Transfer to:  (  ) Medicine    (  ) Telemetry    (  ) RCU    (  ) Palliative    (  ) Stroke Unit    (  ) _______________  Accepting physican:      MICU COURSE:    47 yo F Hx T1DM, R eye enucleation, kidney stone, R ovarian cyst s/p salpingooophorectomy on 7/22/19 presented w/ L flank pain radiating to groin, N/V and fever/chills.  In ED, patient found to be in DKA and had positive U/A.  Patient transferred to MICU for management of DKA.    In MICU, patient started on insulin gtt and zosyn (for possible pyelonephritis based on CT).       ASSESSMENT & PLAN:         For Follow-Up:          Vital Signs Last 24 Hrs  T(C): 37.2 (28 Jul 2019 12:00), Max: 38.1 (27 Jul 2019 16:37)  T(F): 99 (28 Jul 2019 12:00), Max: 100.5 (27 Jul 2019 16:37)  HR: 90 (28 Jul 2019 13:00) (72 - 96)  BP: 144/61 (28 Jul 2019 13:00) (113/99 - 170/66)  BP(mean): 86 (28 Jul 2019 13:00) (75 - 114)  RR: 14 (28 Jul 2019 13:00) (13 - 23)  SpO2: 97% (28 Jul 2019 13:00) (95% - 100%)  I&O's Summary    27 Jul 2019 07:01  -  28 Jul 2019 07:00  --------------------------------------------------------  IN: 956 mL / OUT: 800 mL / NET: 156 mL    28 Jul 2019 07:01  -  28 Jul 2019 16:34  --------------------------------------------------------  IN: 973 mL / OUT: 400 mL / NET: 573 mL          MEDICATIONS  (STANDING):  chlorhexidine 4% Liquid 1 Application(s) Topical <User Schedule>  dextrose 5% + sodium chloride 0.45%. 1000 milliLiter(s) (150 mL/Hr) IV Continuous <Continuous>  heparin  Injectable 5000 Unit(s) SubCutaneous every 8 hours  insulin regular Infusion 5 Unit(s)/Hr (5 mL/Hr) IV Continuous <Continuous>  piperacillin/tazobactam IVPB.. 3.375 Gram(s) IV Intermittent every 8 hours  simethicone 80 milliGRAM(s) Chew every 12 hours    MEDICATIONS  (PRN):  acetaminophen   Tablet .. 650 milliGRAM(s) Oral every 6 hours PRN Mild Pain (1 - 3), Moderate Pain (4 - 6)  HYDROmorphone  Injectable 1 milliGRAM(s) IV Push every 4 hours PRN Severe Pain (7 - 10)  ondansetron Injectable 4 milliGRAM(s) IV Push every 6 hours PRN Nausea and/or Vomiting        LABS                                            11.3                  Neurophils% (auto):   86.1   (07-28 @ 02:20):    17.63)-----------(382          Lymphocytes% (auto):  5.3                                           34.5                   Eosinphils% (auto):   0.0      Manual%: Neutrophils x    ; Lymphocytes x    ; Eosinophils x    ; Bands%: x    ; Blasts x                                    137    |  104    |  5                   Calcium: 8.5   / iCa: x      (07-28 @ 12:50)    ----------------------------<  170       Magnesium: 1.7                              3.8     |  23     |  0.78             Phosphorous: 1.5        ( 07-27 @ 16:53 )   PT: 12.6 SEC;   INR: 1.10   aPTT: 35.2 SEC MICU Transfer Note    Transfer from: MICU  Transfer to:  (  ) Medicine    (  ) Telemetry    (  ) RCU    (  ) Palliative    (  ) Stroke Unit    (  ) _______________  Accepting physician:      MICU COURSE:    49 yo F Hx T1DM, R eye enucleation, kidney stone, R ovarian cyst s/p salpingooophorectomy on 7/22/19 presented w/ L flank pain radiating to groin, N/V and fever/chills.  In ED, patient found to be in DKA and had positive U/A.  Patient transferred to MICU for management of DKA.    In MICU, patient started on insulin gtt and zosyn (for possible pyelonephritis based on CT). Patient's FS improved to less than 200 and patient was started on D5 1/2NS. Insulin gtt was titrated in order to close AG. Repeat BMPs on 7/28 demonstrated AG of 13 and 10. FS improved to 150s and patient was dosed with 16U lantus as per Endo. Patient ate dinner, taken off IVF and started on premeal and ISS with lantus. Patient is hemodynamically stable and ready for transfer to the floor.       ASSESSMENT & PLAN:         For Follow-Up:          Vital Signs Last 24 Hrs  T(C): 37.2 (28 Jul 2019 12:00), Max: 38.1 (27 Jul 2019 16:37)  T(F): 99 (28 Jul 2019 12:00), Max: 100.5 (27 Jul 2019 16:37)  HR: 90 (28 Jul 2019 13:00) (72 - 96)  BP: 144/61 (28 Jul 2019 13:00) (113/99 - 170/66)  BP(mean): 86 (28 Jul 2019 13:00) (75 - 114)  RR: 14 (28 Jul 2019 13:00) (13 - 23)  SpO2: 97% (28 Jul 2019 13:00) (95% - 100%)  I&O's Summary    27 Jul 2019 07:01  -  28 Jul 2019 07:00  --------------------------------------------------------  IN: 956 mL / OUT: 800 mL / NET: 156 mL    28 Jul 2019 07:01  -  28 Jul 2019 16:34  --------------------------------------------------------  IN: 973 mL / OUT: 400 mL / NET: 573 mL          MEDICATIONS  (STANDING):  chlorhexidine 4% Liquid 1 Application(s) Topical <User Schedule>  dextrose 5% + sodium chloride 0.45%. 1000 milliLiter(s) (150 mL/Hr) IV Continuous <Continuous>  heparin  Injectable 5000 Unit(s) SubCutaneous every 8 hours  insulin regular Infusion 5 Unit(s)/Hr (5 mL/Hr) IV Continuous <Continuous>  piperacillin/tazobactam IVPB.. 3.375 Gram(s) IV Intermittent every 8 hours  simethicone 80 milliGRAM(s) Chew every 12 hours    MEDICATIONS  (PRN):  acetaminophen   Tablet .. 650 milliGRAM(s) Oral every 6 hours PRN Mild Pain (1 - 3), Moderate Pain (4 - 6)  HYDROmorphone  Injectable 1 milliGRAM(s) IV Push every 4 hours PRN Severe Pain (7 - 10)  ondansetron Injectable 4 milliGRAM(s) IV Push every 6 hours PRN Nausea and/or Vomiting        LABS                                            11.3                  Neurophils% (auto):   86.1   (07-28 @ 02:20):    17.63)-----------(382          Lymphocytes% (auto):  5.3                                           34.5                   Eosinphils% (auto):   0.0      Manual%: Neutrophils x    ; Lymphocytes x    ; Eosinophils x    ; Bands%: x    ; Blasts x                                    137    |  104    |  5                   Calcium: 8.5   / iCa: x      (07-28 @ 12:50)    ----------------------------<  170       Magnesium: 1.7                              3.8     |  23     |  0.78             Phosphorous: 1.5        ( 07-27 @ 16:53 )   PT: 12.6 SEC;   INR: 1.10   aPTT: 35.2 SEC MICU Transfer Note    Transfer from: MICU  Transfer to:  (  ) Medicine    (  ) Telemetry    (  ) RCU    (  ) Palliative    (  ) Stroke Unit    (  ) _______________  Accepting physician:      MICU COURSE:    49 yo F Hx T1DM, R eye enucleation, kidney stone, R ovarian cyst s/p salpingooophorectomy on 7/22/19 presented w/ L flank pain radiating to groin, N/V and fever/chills.  In ED, patient found to be in DKA and had positive U/A.  Patient transferred to MICU for management of DKA.    In MICU, patient started on insulin gtt and zosyn (for possible pyelonephritis based on CT). Patient's FS improved to less than 200 and patient was started on D5 1/2NS. Insulin gtt was titrated in order to close AG. Repeat BMPs on 7/28 demonstrated AG of 13 and 10. FS improved to 150s and patient was dosed with 16U lantus as per Endo. Patient ate dinner, taken off IVF and started on premeal and ISS with lantus. Patient is hemodynamically stable and ready for transfer to the floor.       ASSESSMENT & PLAN:   49 yo woman w/ PMH of T1DM, R eye enucleation, kidney stone, right ovarian cyst s/p salpingo-oophorectomy 7/22/19 presenting with severe left flank pain and fever at home, found to be in DKA, also with leukocytosis, fever, CVA tenderness and CT suggestive of b/l pyelonephritis.        Neuro:   -uncomfortable due to pain  -Dilaudid 1 mg iv q4h for severe pain, has been controlling pain decently  -Tylenol prn for pain and fever    GI:   -CT A/P: heterogenous enhancement of kidneys w/ perinephric fat stranding, but no abscess or other acute intraabdominal process  -Zofran prn for nausea and vomiting    :  -UA w/ moderate blood, 0-2 RBCs, no bacteria, small LE, 10-25 WBCs, plus no urinary symptoms - overall not very suggestive of UTI, despite findings on CT suggestive of b/l pyelonephritis  -f/u UCx  -f/u CT urogram w/ contrast today to evaluate for ureteral injury from recent GYN procedure       Endo:  -DKA, ketones in blood/urine, initially w/ AG 23 -> down to 10  -transitioned off insulin gtt, dosed lantus 16U at 9:15pm on 7/28, gtt turned off at 11:15, at dinner  - started on 5U premeal with ISS as per endo recs, f/u further recs  -Home regimen: Basaglar 12 u qhs, Humalog 4 u before breakfast, 6 u before lunch and dinner    ID:  -WBCs 19 and fever to 100.5 on admission  -No findings on CT A/P suggestive of abscess or other post-operative complication  -CT showed heterogenous enhancement of kidneys w/ perinephric fat stranding, suggestive of b/l pyelonephritis, but UA not suggestive and pt denies urinary symptoms.    -f/u blood/urine cultures  -Continue with Zosyn    PPX:   -SQH q8h      For Follow-Up:  [ ] further endo recs  [ ] CT urogram  [ ] urine/blood cultures        Vital Signs Last 24 Hrs  T(C): 37.2 (28 Jul 2019 12:00), Max: 38.1 (27 Jul 2019 16:37)  T(F): 99 (28 Jul 2019 12:00), Max: 100.5 (27 Jul 2019 16:37)  HR: 90 (28 Jul 2019 13:00) (72 - 96)  BP: 144/61 (28 Jul 2019 13:00) (113/99 - 170/66)  BP(mean): 86 (28 Jul 2019 13:00) (75 - 114)  RR: 14 (28 Jul 2019 13:00) (13 - 23)  SpO2: 97% (28 Jul 2019 13:00) (95% - 100%)  I&O's Summary    27 Jul 2019 07:01  -  28 Jul 2019 07:00  --------------------------------------------------------  IN: 956 mL / OUT: 800 mL / NET: 156 mL    28 Jul 2019 07:01  -  28 Jul 2019 16:34  --------------------------------------------------------  IN: 973 mL / OUT: 400 mL / NET: 573 mL          MEDICATIONS  (STANDING):  chlorhexidine 4% Liquid 1 Application(s) Topical <User Schedule>  dextrose 5% + sodium chloride 0.45%. 1000 milliLiter(s) (150 mL/Hr) IV Continuous <Continuous>  heparin  Injectable 5000 Unit(s) SubCutaneous every 8 hours  insulin regular Infusion 5 Unit(s)/Hr (5 mL/Hr) IV Continuous <Continuous>  piperacillin/tazobactam IVPB.. 3.375 Gram(s) IV Intermittent every 8 hours  simethicone 80 milliGRAM(s) Chew every 12 hours    MEDICATIONS  (PRN):  acetaminophen   Tablet .. 650 milliGRAM(s) Oral every 6 hours PRN Mild Pain (1 - 3), Moderate Pain (4 - 6)  HYDROmorphone  Injectable 1 milliGRAM(s) IV Push every 4 hours PRN Severe Pain (7 - 10)  ondansetron Injectable 4 milliGRAM(s) IV Push every 6 hours PRN Nausea and/or Vomiting        LABS                                            11.3                  Neurophils% (auto):   86.1   (07-28 @ 02:20):    17.63)-----------(382          Lymphocytes% (auto):  5.3                                           34.5                   Eosinphils% (auto):   0.0      Manual%: Neutrophils x    ; Lymphocytes x    ; Eosinophils x    ; Bands%: x    ; Blasts x                                    137    |  104    |  5                   Calcium: 8.5   / iCa: x      (07-28 @ 12:50)    ----------------------------<  170       Magnesium: 1.7                              3.8     |  23     |  0.78             Phosphorous: 1.5        ( 07-27 @ 16:53 )   PT: 12.6 SEC;   INR: 1.10   aPTT: 35.2 SEC

## 2019-07-28 NOTE — PROGRESS NOTE ADULT - ASSESSMENT
49 yo female w/ PMH of T1DM, POD#6  s/p salpingooophorectomy 7/22/19 for R ovarian cyst admitted in DKA with leukocytosis, fever and CT evidence of b/l pyelonephritis.    CV: hemodynamically stable. H/H 11.3/34.5   Pulm: no acute issues, O2 sats 95-99 in RA  GI: NPO, ins gtt   : voiding spontaneously adequate UOP. CT suggestive of b/l pyelonephritis.   Endo: DKA on ins gtt, AG 23->17, FS improving   ID: Afebrile overnight, WBCs 18->17. No evidence of abscess on CT. Suggestive findings for b/l pyelo. Zosyn (7/27-). No Gyn interventions at this time. F/u Bcx.  Heme: HSQ, venodynes for DVT prophylaxis   Neuro: Analgesia PRN  Dispo: MICU 47 yo female w/ PMH of T1DM, POD#6  s/p salpingooophorectomy 7/22/19 for R ovarian cyst admitted in DKA with leukocytosis, fever and CT evidence of b/l pyelonephritis.    CV: hemodynamically stable. H/H 11.3/34.5   Pulm: no acute issues, O2 sats 95-99 in RA  GI: NPO, ins gtt   : voiding spontaneously adequate UOP. CT suggestive of b/l pyelonephritis. Will review current CT with radiology to rule out possible ureteral injury. If current study inadequate, recommend CT urogram to more definitively assess ureter injury.   Endo: DKA on ins gtt, AG 23->17, FS improving   ID: Afebrile overnight, WBCs 18->17. No evidence of abscess on CT. Suggestive findings for b/l pyelo. Zosyn (7/27-). No Gyn interventions at this time. F/u Bcx.  Heme: HSQ, venodynes for DVT prophylaxis   Neuro: Analgesia PRN  Dispo: MICU 49 yo female w/ PMH of T1DM, POD#6  s/p salpingooophorectomy 7/22/19 for R ovarian cyst admitted in DKA with leukocytosis, fever and CT evidence of b/l pyelonephritis.    CV: hemodynamically stable. H/H 11.3/34.5   Pulm: no acute issues, O2 sats 95-99 in RA  GI: NPO, ins gtt   : voiding spontaneously adequate UOP. CT suggestive of b/l pyelonephritis. Will review current CT with radiology to rule out possible ureteral injury. If current study inadequate, recommend CT urogram to more definitively assess for ureter injury.   Endo: DKA on ins gtt, AG 23->17, FS improving   ID: Afebrile overnight, WBCs 18->17. No evidence of abscess on CT. Suggestive findings for b/l pyelo. Zosyn (7/27-). No Gyn interventions at this time. F/u Bcx.  Heme: HSQ, venodynes for DVT prophylaxis   Neuro: Analgesia PRN  Dispo: MICU

## 2019-07-28 NOTE — CONSULT NOTE ADULT - ASSESSMENT
Patient is a 47 yo woman with type 1 diabetes admitted for DKA found to have pyelonephritis (high medical decision making).

## 2019-07-28 NOTE — CONSULT NOTE ADULT - SUBJECTIVE AND OBJECTIVE BOX
Patient is a 47 yo woman with type 1 diabetes A1c of 9.6%, right eye enucleation, kidney stone, s/p salpingooophorectomy for an ovarian cyst presents with flank pain and fevers.  CT shows perinephric straining. On admission, found to be in DKA with glucose of 439, AG of 23, +BHOB    She was diagnosed with type 1 diabetes 22 years ago. Her outpatient endocrinologist is Dr. Leisa Shannon at [facility].  The patient's home diabetes regime is basaglar 12 units at bedtime and admelog .  Patient misses a few doses a week.  Does not check fingersticks regularly as she has had acute medical issues recently.  States that in the past, her A1c was 12% and she managed to improve glycemic control to 8% earlier this year.  Breakfast: slice of toast with cream cheese, crackers  and crystal tea  Lunch: brown rice with fish  Diner: tuna sandwich  Does not have a sweet tooth but recently has been eating watermelon  Goes to eye doctor regularly for her eye complications    PAST MEDICAL & SURGICAL HISTORY:  Diabetes mellitus type I  Cyst of right ovary  Diabetes  Eye injury  History of salpingoophorectomy: Right-sided  History of eye enucleation: right  H/O  section  Prosthetic eye globe   delivery delivered      FAMILY HISTORY:  Mother: alive and well  Father:  in 80's from heart attack  Has 24 year old son and 3 year old son; no medical problems      Social History:  currently not working  no alcohol  no recreational drugs  non smoker    Outpatient Medications:  basaglar 12 units  ademlog     MEDICATIONS  (STANDING):  chlorhexidine 4% Liquid 1 Application(s) Topical <User Schedule>  dextrose 5% + sodium chloride 0.45%. 1000 milliLiter(s) (150 mL/Hr) IV Continuous <Continuous>  heparin  Injectable 5000 Unit(s) SubCutaneous every 8 hours  insulin regular Infusion 4 Unit(s)/Hr (4 mL/Hr) IV Continuous <Continuous>  piperacillin/tazobactam IVPB.. 3.375 Gram(s) IV Intermittent every 8 hours    MEDICATIONS  (PRN):  acetaminophen   Tablet .. 650 milliGRAM(s) Oral every 6 hours PRN Mild Pain (1 - 3), Moderate Pain (4 - 6)  HYDROmorphone  Injectable 1 milliGRAM(s) IV Push every 4 hours PRN Severe Pain (7 - 10)  ondansetron Injectable 4 milliGRAM(s) IV Push every 6 hours PRN Nausea and/or Vomiting    Allergies  No Known Allergies    Review of Systems:  Constitutional: No fever  Eyes: artificial right eye  Neuro: No tremors  HEENT: No pain  Cardiovascular: No chest pain, palpitations  Respiratory: No SOB, no cough  GI: No nausea, vomiting, abdominal pain  : +flank pain  Skin: no rash  Psych: no depression  Endocrine: no polyuria, polydipsia  ALL OTHER SYSTEMS REVIEWED AND NEGATIVE    UNABLE TO OBTAIN    PHYSICAL EXAM:  VITALS: T(C): 37.2 (19 @ 12:00)  T(F): 99 (19 @ 12:00), Max: 100.5 (19 @ 16:37)  HR: 90 (19 @ 13:00) (72 - 107)  BP: 144/61 (19 @ 13:00) (113/99 - 170/66)  RR:  (13 - 23)  SpO2:  (95% - 100%)  Wt(kg): --  GENERAL: NAD, well-groomed, well-developed  EYES: artifical right eye  HEENT:  Atraumatic, Normocephalic, moist mucous membranes  RESPIRATORY: Clear to auscultation bilaterally; No rales, rhonchi, wheezing, or rubs  CARDIOVASCULAR: Regular rate and rhythm; No murmurs; no peripheral edema  GI: Soft, nontender, non distended, normal bowel sounds  SKIN: Dry, intact, No rashes or lesions; no foot ulcers  MUSCULOSKELETAL: Moves all extremities  PSYCH: Alert and oriented x 3, reactive affect, normal mood  CUSHING'S SIGNS: no striae    POCT Blood Glucose.: 161 mg/dL (19 @ 12:50)  POCT Blood Glucose.: 175 mg/dL (19 @ 11:54)  POCT Blood Glucose.: 156 mg/dL (19 @ 11:05)  POCT Blood Glucose.: 171 mg/dL (19 @ 10:00)  POCT Blood Glucose.: 145 mg/dL (19 @ 09:06)  POCT Blood Glucose.: 136 mg/dL (19 @ 08:05)  POCT Blood Glucose.: 159 mg/dL (19 @ 07:12)  POCT Blood Glucose.: 198 mg/dL (19 @ 06:15)  POCT Blood Glucose.: 200 mg/dL (19 @ 05:07)  POCT Blood Glucose.: 183 mg/dL (19 @ 04:09)  POCT Blood Glucose.: 192 mg/dL (19 @ 03:12)  POCT Blood Glucose.: 174 mg/dL (19 @ 02:06)  POCT Blood Glucose.: 143 mg/dL (19 @ 00:57)  POCT Blood Glucose.: 155 mg/dL (19 @ 23:58)  POCT Blood Glucose.: 173 mg/dL (19 @ 23:19)  POCT Blood Glucose.: 190 mg/dL (19 @ 22:18)  POCT Blood Glucose.: 253 mg/dL (19 @ 21:08)  POCT Blood Glucose.: 309 mg/dL (19 @ 20:09)  POCT Blood Glucose.: 335 mg/dL (19 @ 19:06)  POCT Blood Glucose.: 439 mg/dL (19 @ 15:25)  Insulin gtt past 4-9-1-5-4-4-4 units/hour                          11.3   17.63 )-----------( 382      ( 2019 02:20 )             34.5           139  |  101  |  6<L>  ----------------------------<  160<H>  3.8   |  23  |  0.81    EGFR if : 100  EGFR if non : 86    Ca    9.5        Mg     1.9       Phos  1.9         TPro  8.6<H>  /  Alb  3.7  /  TBili  0.5  /  DBili  x   /  AST  6   /  ALT  6   /  AlkPhos  103    Hemoglobin A1C, Whole Blood: 9.6 % <H> [4.0 - 5.6] (19 @ 05:20)

## 2019-07-28 NOTE — PROGRESS NOTE ADULT - ASSESSMENT
Ms. June is a 49 yo woman w/ PMH of T1DM, R eye enucleation, kidney stone, right ovarian cyst s/p salpingooophorectomy 7/22/19 presenting with severe left flank pain and fever at home, found to be in DKA, also with leukocytosis, fever, CVA tenderness and CT suggestive of b/l pyelonephritis.        Neuro:   -uncomfortable due to pain  -Dilaudid 1 mg iv q4h for severe pain, has been controlling pain decently  -Tylenol prn for pain and fever    Pulm:  -no active issues    CV:  -no active issues    GI:   -CT A/P: heterogenous enhancement of kidneys w/ perinephric fat stranding, but no abscess or other acute intraabdominal process  -Zofran prn for nausea and vomiting    :  -UA w/ moderate blood, 0-2 RBCs, no bacteria, small LE, 10-25 WBCs, plus no urinary symptoms - overall not very suggestive of UTI, despite findings on CT suggestive of b/l pyelonephritis  -f/u UCx  -CT urogram w/ contrast today to evaluate for ureteral injury from recent GYN procedure       Endo:  -DKA, ketones in blood/urine, initially w/ AG 23 -> down to 17   -On insulin gtt  -FS q1hr and BMP, VBG q4h   -D5+1/2NS 100 cc/hr  -Home regimen: Basaglar 12 u qhs, Humalog 4 u before breakfast, 6 u before lunch and dinner  -per endo, will transition to lantus 12 U and ISS 2 hours before insulin gtt d/c; will add 4 U humalog pre-meal if patient has diet    ID:  -WBCs 19 and fever to 100.5 on admission  -No findings on CT A/P suggestive of abscess or other post-operative complication  -CT showed heterogenous enhancement of kidneys w/ perinephric fat stranding, suggestive of b/l pyelonephritis, but UA not suggestive and pt denies urinary symptoms.    -f/u blood/urine cultures  -Continue with Zosyn    PPX:   -SQH q8h

## 2019-07-28 NOTE — PROGRESS NOTE ADULT - SUBJECTIVE AND OBJECTIVE BOX
CHIEF COMPLAINT:    Interval Events:    REVIEW OF SYSTEMS:  Constitutional:   Eyes:  ENT:  CV:  Resp:  GI:  :  MSK:  Integumentary:  Neurological:  Psychiatric:  Endocrine:  Hematologic/Lymphatic:  Allergic/Immunologic:  [ ] All other systems negative  [ ] Unable to assess ROS because ________    OBJECTIVE:  ICU Vital Signs Last 24 Hrs  T(C): 36.6 (2019 04:00), Max: 38.1 (2019 16:37)  T(F): 97.8 (2019 04:00), Max: 100.5 (2019 16:37)  HR: 94 (2019 07:00) (72 - 107)  BP: 131/66 (2019 07:00) (113/99 - 170/66)  BP(mean): 84 (2019 07:00) (75 - 114)  ABP: --  ABP(mean): --  RR: 20 (2019 07:00) (13 - 21)  SpO2: 99% (2019 07:00) (95% - 100%)         @ 07:01  -   @ 07:00  --------------------------------------------------------  IN: 956 mL / OUT: 800 mL / NET: 156 mL      CAPILLARY BLOOD GLUCOSE      POCT Blood Glucose.: 159 mg/dL (2019 07:12)      PHYSICAL EXAM:  General:   HEENT:   Lymph Nodes:  Neck:   Respiratory:   Cardiovascular:   Abdomen:   Extremities:   Skin:   Neurological:  Psychiatry:    LINES:    HOSPITAL MEDICATIONS:  MEDICATIONS  (STANDING):  chlorhexidine 4% Liquid 1 Application(s) Topical <User Schedule>  dextrose 5% + sodium chloride 0.45%. 1000 milliLiter(s) (100 mL/Hr) IV Continuous <Continuous>  heparin  Injectable 5000 Unit(s) SubCutaneous every 8 hours  insulin regular Infusion 5 Unit(s)/Hr (5 mL/Hr) IV Continuous <Continuous>  piperacillin/tazobactam IVPB.. 3.375 Gram(s) IV Intermittent every 8 hours    MEDICATIONS  (PRN):  acetaminophen   Tablet .. 650 milliGRAM(s) Oral every 6 hours PRN Mild Pain (1 - 3), Moderate Pain (4 - 6)  HYDROmorphone  Injectable 1 milliGRAM(s) IV Push every 4 hours PRN Severe Pain (7 - 10)  ondansetron Injectable 4 milliGRAM(s) IV Push every 6 hours PRN Nausea and/or Vomiting      LABS:                        11.3   17.63 )-----------( 382      ( 2019 02:20 )             34.5         137  |  101  |  6<L>  ----------------------------<  181<H>  3.8   |  21<L>  |  0.74    Ca    9.1      2019 06:00  Phos  2.2       Mg     1.9         TPro  8.6<H>  /  Alb  3.7  /  TBili  0.5  /  DBili  x   /  AST  6   /  ALT  6   /  AlkPhos  103      PT/INR - ( 2019 16:53 )   PT: 12.6 SEC;   INR: 1.10          PTT - ( 2019 16:53 )  PTT:35.2 SEC  Urinalysis Basic - ( 2019 16:58 )    Color: LIGHT YELLOW / Appearance: CLEAR / S.018 / pH: 5.5  Gluc: >1000 / Ketone: >150  / Bili: NEGATIVE / Urobili: NORMAL   Blood: MODERATE / Protein: 20 / Nitrite: NEGATIVE   Leuk Esterase: SMALL / RBC: 0-2 / WBC 11-25   Sq Epi: OCC / Non Sq Epi: x / Bacteria: NEGATIVE        Venous Blood Gas:   @ 06:00  7.39/43/42/25/81.5  VBG Lactate: 1.4  Venous Blood Gas:   @ 02:20  7.38/39/163/23/99.5  VBG Lactate: 1.0  Venous Blood Gas:   @ 18:50  7.34/46/32/22/52.3  VBG Lactate: 0.9  Venous Blood Gas:   @ 16:27  7.26/51/17/19/23.6  VBG Lactate: 2.3      MICROBIOLOGY:     RADIOLOGY:  [ ] Reviewed and interpreted by me    EKG: CHIEF COMPLAINT: DKA    Interval Events:    Patient says left sided pain still severe, however controlled with Dilaudid.  Less fever and chills, less N/V.  No CP, SOB, change in bowel habits, change in urination frequency.      REVIEW OF SYSTEMS:    [ ] All other systems negative    OBJECTIVE:  ICU Vital Signs Last 24 Hrs  T(C): 36.6 (2019 04:00), Max: 38.1 (2019 16:37)  T(F): 97.8 (2019 04:00), Max: 100.5 (2019 16:37)  HR: 94 (2019 07:00) (72 - 107)  BP: 131/66 (2019 07:00) (113/99 - 170/66)  BP(mean): 84 (2019 07:00) (75 - 114)  ABP: --  ABP(mean): --  RR: 20 (2019 07:00) (13 - 21)  SpO2: 99% (2019 07:00) (95% - 100%)         @ 07:01  -   @ 07:00  --------------------------------------------------------  IN: 956 mL / OUT: 800 mL / NET: 156 mL      CAPILLARY BLOOD GLUCOSE      POCT Blood Glucose.: 159 mg/dL (2019 07:12)      PHYSICAL EXAM:  General: pain, uncomfortable  HEENT: NC/AT  Respiratory: clear to auscultation b/l  Cardiovascular: RRR no murmurs rubs or gallops  Abdomen: soft; tender to palpation on left and on left flank; epigastric tenderness; bowel sounds all 4 quadrants  Extremities: 2+ peripheral pulses, no edema  Skin: no rashes or lesions  Neurological: AO x 3    LINES:    HOSPITAL MEDICATIONS:  MEDICATIONS  (STANDING):  chlorhexidine 4% Liquid 1 Application(s) Topical <User Schedule>  dextrose 5% + sodium chloride 0.45%. 1000 milliLiter(s) (100 mL/Hr) IV Continuous <Continuous>  heparin  Injectable 5000 Unit(s) SubCutaneous every 8 hours  insulin regular Infusion 5 Unit(s)/Hr (5 mL/Hr) IV Continuous <Continuous>  piperacillin/tazobactam IVPB.. 3.375 Gram(s) IV Intermittent every 8 hours    MEDICATIONS  (PRN):  acetaminophen   Tablet .. 650 milliGRAM(s) Oral every 6 hours PRN Mild Pain (1 - 3), Moderate Pain (4 - 6)  HYDROmorphone  Injectable 1 milliGRAM(s) IV Push every 4 hours PRN Severe Pain (7 - 10)  ondansetron Injectable 4 milliGRAM(s) IV Push every 6 hours PRN Nausea and/or Vomiting      LABS:                        11.3   17.63 )-----------( 382      ( 2019 02:20 )             34.5         137  |  101  |  6<L>  ----------------------------<  181<H>  3.8   |  21<L>  |  0.74    Ca    9.1      2019 06:00  Phos  2.2       Mg     1.9         TPro  8.6<H>  /  Alb  3.7  /  TBili  0.5  /  DBili  x   /  AST  6   /  ALT  6   /  AlkPhos  103      PT/INR - ( 2019 16:53 )   PT: 12.6 SEC;   INR: 1.10          PTT - ( 2019 16:53 )  PTT:35.2 SEC  Urinalysis Basic - ( 2019 16:58 )    Color: LIGHT YELLOW / Appearance: CLEAR / S.018 / pH: 5.5  Gluc: >1000 / Ketone: >150  / Bili: NEGATIVE / Urobili: NORMAL   Blood: MODERATE / Protein: 20 / Nitrite: NEGATIVE   Leuk Esterase: SMALL / RBC: 0-2 / WBC 11-25   Sq Epi: OCC / Non Sq Epi: x / Bacteria: NEGATIVE        Venous Blood Gas:   @ 06:00  7.39/43/42/25/81.5  VBG Lactate: 1.4  Venous Blood Gas:   @ 02:20  7.38/39/163/23/99.5  VBG Lactate: 1.0  Venous Blood Gas:   @ 18:50  7.34/46/32/22/52.3  VBG Lactate: 0.9  Venous Blood Gas:   @ 16:27  7.26/51/17//23.6  VBG Lactate: 2.3      MICROBIOLOGY:     RADIOLOGY:  [ ] Reviewed and interpreted by me    EKG: CHIEF COMPLAINT: DKA    Interval Events:    Patient says left sided pain still severe, however controlled with Dilaudid.  Less fever and chills, less N/V.  No CP, SOB, change in bowel habits, change in urination frequency.      REVIEW OF SYSTEMS:    [x ] All other systems negative    OBJECTIVE:  ICU Vital Signs Last 24 Hrs  T(C): 36.6 (2019 04:00), Max: 38.1 (2019 16:37)  T(F): 97.8 (2019 04:00), Max: 100.5 (2019 16:37)  HR: 94 (2019 07:00) (72 - 107)  BP: 131/66 (2019 07:00) (113/99 - 170/66)  BP(mean): 84 (2019 07:00) (75 - 114)  ABP: --  ABP(mean): --  RR: 20 (2019 07:00) (13 - 21)  SpO2: 99% (2019 07:00) (95% - 100%)         @ 07:01  -  - @ 07:00  --------------------------------------------------------  IN: 956 mL / OUT: 800 mL / NET: 156 mL      CAPILLARY BLOOD GLUCOSE      POCT Blood Glucose.: 159 mg/dL (2019 07:12)      PHYSICAL EXAM:  General: pain, uncomfortable  HEENT: NC/AT  Respiratory: clear to auscultation b/l  Cardiovascular: RRR no murmurs rubs or gallops  Abdomen: soft; tender to palpation on left and on left flank; epigastric tenderness; bowel sounds all 4 quadrants  Extremities: 2+ peripheral pulses, no edema  Skin: no rashes or lesions  Neurological: AO x 3    LINES:    HOSPITAL MEDICATIONS:  MEDICATIONS  (STANDING):  chlorhexidine 4% Liquid 1 Application(s) Topical <User Schedule>  dextrose 5% + sodium chloride 0.45%. 1000 milliLiter(s) (100 mL/Hr) IV Continuous <Continuous>  heparin  Injectable 5000 Unit(s) SubCutaneous every 8 hours  insulin regular Infusion 5 Unit(s)/Hr (5 mL/Hr) IV Continuous <Continuous>  piperacillin/tazobactam IVPB.. 3.375 Gram(s) IV Intermittent every 8 hours    MEDICATIONS  (PRN):  acetaminophen   Tablet .. 650 milliGRAM(s) Oral every 6 hours PRN Mild Pain (1 - 3), Moderate Pain (4 - 6)  HYDROmorphone  Injectable 1 milliGRAM(s) IV Push every 4 hours PRN Severe Pain (7 - 10)  ondansetron Injectable 4 milliGRAM(s) IV Push every 6 hours PRN Nausea and/or Vomiting      LABS:                        11.3   17.63 )-----------( 382      ( 2019 02:20 )             34.5         137  |  101  |  6<L>  ----------------------------<  181<H>  3.8   |  21<L>  |  0.74    Ca    9.1      2019 06:00  Phos  2.2       Mg     1.9         TPro  8.6<H>  /  Alb  3.7  /  TBili  0.5  /  DBili  x   /  AST  6   /  ALT  6   /  AlkPhos  103      PT/INR - ( 2019 16:53 )   PT: 12.6 SEC;   INR: 1.10          PTT - ( 2019 16:53 )  PTT:35.2 SEC  Urinalysis Basic - ( 2019 16:58 )    Color: LIGHT YELLOW / Appearance: CLEAR / S.018 / pH: 5.5  Gluc: >1000 / Ketone: >150  / Bili: NEGATIVE / Urobili: NORMAL   Blood: MODERATE / Protein: 20 / Nitrite: NEGATIVE   Leuk Esterase: SMALL / RBC: 0-2 / WBC 11-25   Sq Epi: OCC / Non Sq Epi: x / Bacteria: NEGATIVE        Venous Blood Gas:   @ 06:00  7.39/43/42/25/81.5  VBG Lactate: 1.4  Venous Blood Gas:   @ 02:20  7.38/39/163/23/99.5  VBG Lactate: 1.0  Venous Blood Gas:   @ 18:50  7.34/46/32/22/52.3  VBG Lactate: 0.9  Venous Blood Gas:   @ 16:27  7.26/51/17/19/23.6  VBG Lactate: 2.3      MICROBIOLOGY:     RADIOLOGY:  [ ] Reviewed and interpreted by me    EKG:

## 2019-07-28 NOTE — PROGRESS NOTE ADULT - SUBJECTIVE AND OBJECTIVE BOX
HD#1      POD#6     Patient seen and examined at bedside, no acute overnight events. No acute complaints. Pain about the same as yesterday. Patient is NPO with improving blood glucose control, voiding spontaneously. Denies CP, SOB, N/V, fevers, and chills.    Vital Signs Last 24 Hours  T(C): 36.6 (07-28-19 @ 04:00), Max: 38.1 (07-27-19 @ 16:37)  HR: 94 (07-28-19 @ 07:00) (72 - 107)  BP: 131/66 (07-28-19 @ 07:00) (113/99 - 170/66)  RR: 20 (07-28-19 @ 07:00) (13 - 21)  SpO2: 99% (07-28-19 @ 07:00) (95% - 100%)    I&O's Summary    27 Jul 2019 07:01  -  28 Jul 2019 07:00  --------------------------------------------------------  IN: 956 mL / OUT: 800 mL / NET: 156 mL        Physical Exam:  General: NAD  CV: NR, RRR  Lungs: CTAB  Abdomen: Soft, non-tender, non-distended  Incision: CDI  Ext: No pain or swelling      Labs:             11.3<L>  17.63<H> )-----------( 382      ( 07-28 @ 02:20 )             34.5                12.9   18.91<H> )-----------( 393      ( 07-27 @ 16:27 )             39.0         MEDICATIONS  (STANDING):  chlorhexidine 4% Liquid 1 Application(s) Topical <User Schedule>  dextrose 5% + sodium chloride 0.45%. 1000 milliLiter(s) (100 mL/Hr) IV Continuous <Continuous>  heparin  Injectable 5000 Unit(s) SubCutaneous every 8 hours  insulin regular Infusion 5 Unit(s)/Hr (5 mL/Hr) IV Continuous <Continuous>  piperacillin/tazobactam IVPB.. 3.375 Gram(s) IV Intermittent every 8 hours    MEDICATIONS  (PRN):  acetaminophen   Tablet .. 650 milliGRAM(s) Oral every 6 hours PRN Mild Pain (1 - 3), Moderate Pain (4 - 6)  HYDROmorphone  Injectable 1 milliGRAM(s) IV Push every 4 hours PRN Severe Pain (7 - 10)  ondansetron Injectable 4 milliGRAM(s) IV Push every 6 hours PRN Nausea and/or Vomiting

## 2019-07-29 DIAGNOSIS — E10.10 TYPE 1 DIABETES MELLITUS WITH KETOACIDOSIS WITHOUT COMA: ICD-10-CM

## 2019-07-29 DIAGNOSIS — N83.201 UNSPECIFIED OVARIAN CYST, RIGHT SIDE: ICD-10-CM

## 2019-07-29 DIAGNOSIS — N12 TUBULO-INTERSTITIAL NEPHRITIS, NOT SPECIFIED AS ACUTE OR CHRONIC: ICD-10-CM

## 2019-07-29 DIAGNOSIS — Z29.9 ENCOUNTER FOR PROPHYLACTIC MEASURES, UNSPECIFIED: ICD-10-CM

## 2019-07-29 DIAGNOSIS — R10.12 LEFT UPPER QUADRANT PAIN: ICD-10-CM

## 2019-07-29 DIAGNOSIS — K59.00 CONSTIPATION, UNSPECIFIED: ICD-10-CM

## 2019-07-29 LAB
ANION GAP SERPL CALC-SCNC: 13 MMO/L — SIGNIFICANT CHANGE UP (ref 7–14)
BACTERIA UR CULT: SIGNIFICANT CHANGE UP
BASOPHILS # BLD AUTO: 0.01 K/UL — SIGNIFICANT CHANGE UP (ref 0–0.2)
BASOPHILS NFR BLD AUTO: 0.1 % — SIGNIFICANT CHANGE UP (ref 0–2)
BUN SERPL-MCNC: 5 MG/DL — LOW (ref 7–23)
CALCIUM SERPL-MCNC: 8.2 MG/DL — LOW (ref 8.4–10.5)
CHLORIDE SERPL-SCNC: 98 MMOL/L — SIGNIFICANT CHANGE UP (ref 98–107)
CO2 SERPL-SCNC: 22 MMOL/L — SIGNIFICANT CHANGE UP (ref 22–31)
CREAT SERPL-MCNC: 0.79 MG/DL — SIGNIFICANT CHANGE UP (ref 0.5–1.3)
EOSINOPHIL # BLD AUTO: 0.01 K/UL — SIGNIFICANT CHANGE UP (ref 0–0.5)
EOSINOPHIL NFR BLD AUTO: 0.1 % — SIGNIFICANT CHANGE UP (ref 0–6)
GLUCOSE BLDC GLUCOMTR-MCNC: 206 MG/DL — HIGH (ref 70–99)
GLUCOSE BLDC GLUCOMTR-MCNC: 278 MG/DL — HIGH (ref 70–99)
GLUCOSE BLDC GLUCOMTR-MCNC: 307 MG/DL — HIGH (ref 70–99)
GLUCOSE BLDC GLUCOMTR-MCNC: 358 MG/DL — HIGH (ref 70–99)
GLUCOSE SERPL-MCNC: 348 MG/DL — HIGH (ref 70–99)
HCT VFR BLD CALC: 30.9 % — LOW (ref 34.5–45)
HGB BLD-MCNC: 10.3 G/DL — LOW (ref 11.5–15.5)
IMM GRANULOCYTES NFR BLD AUTO: 0.5 % — SIGNIFICANT CHANGE UP (ref 0–1.5)
LYMPHOCYTES # BLD AUTO: 1.31 K/UL — SIGNIFICANT CHANGE UP (ref 1–3.3)
LYMPHOCYTES # BLD AUTO: 10 % — LOW (ref 13–44)
MAGNESIUM SERPL-MCNC: 1.7 MG/DL — SIGNIFICANT CHANGE UP (ref 1.6–2.6)
MCHC RBC-ENTMCNC: 28.5 PG — SIGNIFICANT CHANGE UP (ref 27–34)
MCHC RBC-ENTMCNC: 33.3 % — SIGNIFICANT CHANGE UP (ref 32–36)
MCV RBC AUTO: 85.6 FL — SIGNIFICANT CHANGE UP (ref 80–100)
MONOCYTES # BLD AUTO: 0.87 K/UL — SIGNIFICANT CHANGE UP (ref 0–0.9)
MONOCYTES NFR BLD AUTO: 6.6 % — SIGNIFICANT CHANGE UP (ref 2–14)
NEUTROPHILS # BLD AUTO: 10.88 K/UL — HIGH (ref 1.8–7.4)
NEUTROPHILS NFR BLD AUTO: 82.7 % — HIGH (ref 43–77)
NRBC # FLD: 0 K/UL — SIGNIFICANT CHANGE UP (ref 0–0)
PHOSPHATE SERPL-MCNC: 2.7 MG/DL — SIGNIFICANT CHANGE UP (ref 2.5–4.5)
PLATELET # BLD AUTO: 382 K/UL — SIGNIFICANT CHANGE UP (ref 150–400)
PMV BLD: 11.3 FL — SIGNIFICANT CHANGE UP (ref 7–13)
POTASSIUM SERPL-MCNC: 4.2 MMOL/L — SIGNIFICANT CHANGE UP (ref 3.5–5.3)
POTASSIUM SERPL-SCNC: 4.2 MMOL/L — SIGNIFICANT CHANGE UP (ref 3.5–5.3)
RBC # BLD: 3.61 M/UL — LOW (ref 3.8–5.2)
RBC # FLD: 11.9 % — SIGNIFICANT CHANGE UP (ref 10.3–14.5)
SODIUM SERPL-SCNC: 133 MMOL/L — LOW (ref 135–145)
SPECIMEN SOURCE: SIGNIFICANT CHANGE UP
WBC # BLD: 13.15 K/UL — HIGH (ref 3.8–10.5)
WBC # FLD AUTO: 13.15 K/UL — HIGH (ref 3.8–10.5)

## 2019-07-29 PROCEDURE — 99232 SBSQ HOSP IP/OBS MODERATE 35: CPT

## 2019-07-29 PROCEDURE — 99233 SBSQ HOSP IP/OBS HIGH 50: CPT

## 2019-07-29 RX ORDER — INSULIN GLARGINE 100 [IU]/ML
20 INJECTION, SOLUTION SUBCUTANEOUS AT BEDTIME
Refills: 0 | Status: DISCONTINUED | OUTPATIENT
Start: 2019-07-29 | End: 2019-07-30

## 2019-07-29 RX ORDER — SENNA PLUS 8.6 MG/1
TABLET ORAL
Refills: 0 | Status: DISCONTINUED | OUTPATIENT
Start: 2019-07-29 | End: 2019-08-01

## 2019-07-29 RX ORDER — INSULIN LISPRO 100/ML
4 VIAL (ML) SUBCUTANEOUS ONCE
Refills: 0 | Status: COMPLETED | OUTPATIENT
Start: 2019-07-29 | End: 2019-07-29

## 2019-07-29 RX ORDER — INSULIN LISPRO 100/ML
6 VIAL (ML) SUBCUTANEOUS
Refills: 0 | Status: DISCONTINUED | OUTPATIENT
Start: 2019-07-29 | End: 2019-08-02

## 2019-07-29 RX ORDER — SENNA PLUS 8.6 MG/1
2 TABLET ORAL DAILY
Refills: 0 | Status: DISCONTINUED | OUTPATIENT
Start: 2019-07-30 | End: 2019-08-01

## 2019-07-29 RX ORDER — KETOROLAC TROMETHAMINE 30 MG/ML
15 SYRINGE (ML) INJECTION EVERY 6 HOURS
Refills: 0 | Status: DISCONTINUED | OUTPATIENT
Start: 2019-07-29 | End: 2019-08-03

## 2019-07-29 RX ORDER — SENNA PLUS 8.6 MG/1
2 TABLET ORAL ONCE
Refills: 0 | Status: COMPLETED | OUTPATIENT
Start: 2019-07-29 | End: 2019-07-29

## 2019-07-29 RX ORDER — DOCUSATE SODIUM 100 MG
100 CAPSULE ORAL
Refills: 0 | Status: DISCONTINUED | OUTPATIENT
Start: 2019-07-29 | End: 2019-08-03

## 2019-07-29 RX ORDER — POLYETHYLENE GLYCOL 3350 17 G/17G
17 POWDER, FOR SOLUTION ORAL ONCE
Refills: 0 | Status: COMPLETED | OUTPATIENT
Start: 2019-07-29 | End: 2019-07-29

## 2019-07-29 RX ORDER — PSYLLIUM SEED (WITH DEXTROSE)
1 POWDER (GRAM) ORAL ONCE
Refills: 0 | Status: DISCONTINUED | OUTPATIENT
Start: 2019-07-29 | End: 2019-07-29

## 2019-07-29 RX ADMIN — POLYETHYLENE GLYCOL 3350 17 GRAM(S): 17 POWDER, FOR SOLUTION ORAL at 12:48

## 2019-07-29 RX ADMIN — SIMETHICONE 80 MILLIGRAM(S): 80 TABLET, CHEWABLE ORAL at 05:00

## 2019-07-29 RX ADMIN — ONDANSETRON 4 MILLIGRAM(S): 8 TABLET, FILM COATED ORAL at 03:42

## 2019-07-29 RX ADMIN — PIPERACILLIN AND TAZOBACTAM 25 GRAM(S): 4; .5 INJECTION, POWDER, LYOPHILIZED, FOR SOLUTION INTRAVENOUS at 08:38

## 2019-07-29 RX ADMIN — PIPERACILLIN AND TAZOBACTAM 25 GRAM(S): 4; .5 INJECTION, POWDER, LYOPHILIZED, FOR SOLUTION INTRAVENOUS at 17:39

## 2019-07-29 RX ADMIN — HEPARIN SODIUM 5000 UNIT(S): 5000 INJECTION INTRAVENOUS; SUBCUTANEOUS at 12:49

## 2019-07-29 RX ADMIN — Medication 1: at 17:41

## 2019-07-29 RX ADMIN — Medication 650 MILLIGRAM(S): at 23:05

## 2019-07-29 RX ADMIN — HEPARIN SODIUM 5000 UNIT(S): 5000 INJECTION INTRAVENOUS; SUBCUTANEOUS at 05:01

## 2019-07-29 RX ADMIN — Medication 5 UNIT(S): at 12:49

## 2019-07-29 RX ADMIN — PIPERACILLIN AND TAZOBACTAM 25 GRAM(S): 4; .5 INJECTION, POWDER, LYOPHILIZED, FOR SOLUTION INTRAVENOUS at 23:55

## 2019-07-29 RX ADMIN — Medication 3: at 08:38

## 2019-07-29 RX ADMIN — Medication 4 UNIT(S): at 05:00

## 2019-07-29 RX ADMIN — Medication 3: at 12:48

## 2019-07-29 RX ADMIN — SENNA PLUS 2 TABLET(S): 8.6 TABLET ORAL at 12:48

## 2019-07-29 RX ADMIN — Medication 650 MILLIGRAM(S): at 22:07

## 2019-07-29 RX ADMIN — Medication 5 UNIT(S): at 08:38

## 2019-07-29 RX ADMIN — INSULIN GLARGINE 20 UNIT(S): 100 INJECTION, SOLUTION SUBCUTANEOUS at 22:04

## 2019-07-29 RX ADMIN — Medication 6 UNIT(S): at 17:41

## 2019-07-29 RX ADMIN — Medication 100 MILLIGRAM(S): at 17:43

## 2019-07-29 RX ADMIN — ONDANSETRON 4 MILLIGRAM(S): 8 TABLET, FILM COATED ORAL at 12:49

## 2019-07-29 RX ADMIN — SIMETHICONE 80 MILLIGRAM(S): 80 TABLET, CHEWABLE ORAL at 17:40

## 2019-07-29 RX ADMIN — PIPERACILLIN AND TAZOBACTAM 25 GRAM(S): 4; .5 INJECTION, POWDER, LYOPHILIZED, FOR SOLUTION INTRAVENOUS at 00:04

## 2019-07-29 NOTE — PROGRESS NOTE ADULT - SUBJECTIVE AND OBJECTIVE BOX
Patient is a 48y old  Female who presents with a chief complaint of DKA, pyelonephritis (2019 13:08)      INTERVAL HPI/OVERNIGHT EVENTS:  No overnight events, patient stable after transfer from MICU.  She is taking minimal PO fluids and essentially no solid food from her regular diet - she says this is mostly because of her constipation and abdominal discomfort.  She otherwise says she is doing better.  Now that she is able to give a better history, she states that due to her pain and discomfort after her Gyn surgery she was not reliably taking her insulin.    T(C): 36.9 (19 @ 05:45), Max: 36.9 (19 @ 20:00)  HR: 69 (19 @ 05:45) (69 - 102)  BP: 114/51 (19 @ 05:45) (114/51 - 161/138)  RR: 17 (19 @ 05:45) (15 - 25)  SpO2: 99% (19 @ 05:45) (95% - 100%)  Wt(kg): --  I&O's Summary    2019 07:01  -  2019 07:00  --------------------------------------------------------  IN: 2502 mL / OUT: 1700 mL / NET: 802 mL        PHYSICAL EXAM:  GENERAL: Lying in bed comfortably in NAD  HEAD:  Atraumatic, Normocephalic  EYES: PERRL, EOMs intact b/l,   ENMT: Moist Mucus membranes  NERVOUS SYSTEM:  A&Ox4, Normal Motor strenght in b/l Upper and lower extremities, gross sensation to light tough intact in b/l upper and lower extremities, CNs II-XII intact b/l w/out focal defcitis, EOMs intact, and PERRL  CHEST/LUNG: CTAB no w/r/r  HEART: RRR no m/g/r  ABDOMEN: +BS, soft, NT, ND  EXTREMITIES:  +2 radial pulses b/l, no LE edema  LYMPH: No anterior/Posterior or supraclavicular LAD  SKIN: No new rashes or DTIs, warm, dry, and intact    PAST MEDICAL & SURGICAL HISTORY:  Diabetes mellitus type I  Cyst of right ovary  Diabetes  Eye injury  History of salpingoophorectomy: Right-sided  History of eye enucleation: right  H/O  section  Prosthetic eye globe   delivery delivered      MEDICATIONS  (STANDING):  docusate sodium 100 milliGRAM(s) Oral two times a day  heparin  Injectable 5000 Unit(s) SubCutaneous every 8 hours  insulin glargine Injectable (LANTUS) 20 Unit(s) SubCutaneous at bedtime  insulin lispro (HumaLOG) corrective regimen sliding scale   SubCutaneous three times a day before meals  insulin lispro (HumaLOG) corrective regimen sliding scale   SubCutaneous at bedtime  insulin lispro Injectable (HumaLOG) 6 Unit(s) SubCutaneous three times a day before meals  piperacillin/tazobactam IVPB.. 3.375 Gram(s) IV Intermittent every 8 hours  senna      simethicone 80 milliGRAM(s) Chew every 12 hours    MEDICATIONS  (PRN):  acetaminophen   Tablet .. 650 milliGRAM(s) Oral every 6 hours PRN Mild Pain (1 - 3), Moderate Pain (4 - 6)  ketorolac   Injectable 15 milliGRAM(s) IV Push every 6 hours PRN Severe Pain (7 - 10)  ondansetron Injectable 4 milliGRAM(s) IV Push every 6 hours PRN Nausea and/or Vomiting      REVIEW OF SYSTEMS:  ROS:  GENERAL: No fever, no chills, no night sweats, no weight gain or loss  EYES: no change in vision, no blurred vision  HEENT: no trouble swallowing, no trouble speaking, no congestion, no sore throat  NECK: no pain or stiffness  CV: no chest pain or palpitations  RESP: no cough, no shortness of breath  GI: +abdominal pain in epigastrum and LUQ and L flank, no nausea, no vomiting, no diarrhea, +constipation, no BRBPR or melena  : No dysuria, +frequency  NEURO: no headache, no weakness, no dizziness  SKIN: no rashes  HEME: No easy bruising or bleeding  MSK: No joint pain        LABS:                        10.3   13.15 )-----------( 382      ( 2019 02:27 )             30.9     07-29    133<L>  |  98  |  5<L>  ----------------------------<  348<H>  4.2   |  22  |  0.79    Ca    8.2<L>      2019 02:27  Phos  2.7       Mg     1.7         TPro  8.6<H>  /  Alb  3.7  /  TBili  0.5  /  DBili  x   /  AST  6   /  ALT  6   /  AlkPhos  103      LIVER FUNCTIONS - ( 2019 16:27 )  Alb: 3.7 g/dL / Pro: 8.6 g/dL / ALK PHOS: 103 u/L / ALT: 6 u/L / AST: 6 u/L / GGT: x     / T. Bili 0.5 mg/dL / D. Bili x         PT/INR - ( 2019 16:53 )   PT: 12.6 SEC;   INR: 1.10          PTT - ( 2019 16:53 )  PTT:35.2 SEC  Urinalysis Basic - ( 2019 16:58 )    Color: LIGHT YELLOW / Appearance: CLEAR / S.018 / pH: 5.5  Gluc: >1000 / Ketone: >150  / Bili: NEGATIVE / Urobili: NORMAL   Blood: MODERATE / Protein: 20 / Nitrite: NEGATIVE   Leuk Esterase: SMALL / RBC: 0-2 / WBC 11-25   Sq Epi: OCC / Non Sq Epi: x / Bacteria: NEGATIVE      CAPILLARY BLOOD GLUCOSE      POCT Blood Glucose.: 278 mg/dL (2019 12:35)  POCT Blood Glucose.: 278 mg/dL (2019 08:37)  POCT Blood Glucose.: 307 mg/dL (2019 06:14)  POCT Blood Glucose.: 358 mg/dL (2019 04:27)  POCT Blood Glucose.: 206 mg/dL (2019 00:02)  POCT Blood Glucose.: 95 mg/dL (2019 23:02)  POCT Blood Glucose.: 132 mg/dL (2019 22:01)  POCT Blood Glucose.: 153 mg/dL (2019 21:01)  POCT Blood Glucose.: 172 mg/dL (2019 19:52)  POCT Blood Glucose.: 171 mg/dL (2019 18:53)  POCT Blood Glucose.: 186 mg/dL (2019 18:08)  POCT Blood Glucose.: 183 mg/dL (2019 17:14)  POCT Blood Glucose.: 377 mg/dL (2019 17:11)  POCT Blood Glucose.: 191 mg/dL (2019 16:16)  POCT Blood Glucose.: 162 mg/dL (2019 14:55)      Urinalysis Basic - ( 2019 16:58 )    Color: LIGHT YELLOW / Appearance: CLEAR / S.018 / pH: 5.5  Gluc: >1000 / Ketone: >150  / Bili: NEGATIVE / Urobili: NORMAL   Blood: MODERATE / Protein: 20 / Nitrite: NEGATIVE   Leuk Esterase: SMALL / RBC: 0-2 / WBC 11-25   Sq Epi: OCC / Non Sq Epi: x / Bacteria: NEGATIVE        RADIOLOGY & ADDITIONAL TESTS:  CT - Urogram demonstrates no ureteral injury despite concern on prior CT abd/pelv for perinephric stranding.

## 2019-07-29 NOTE — PROGRESS NOTE ADULT - PROBLEM SELECTOR PLAN 1
- In ED found to have ketones in urine and AG of 23.  Initial AG 23, down to 10 on MICU transfer to floor.    - Transitioned off insulin gtt with lantus dosed at 16U (given at 21:15, gtt off at 23:15).  5U with meals per endo recs.

## 2019-07-29 NOTE — PROGRESS NOTE ADULT - PROBLEM SELECTOR PLAN 3
- L flank pain, WBC 19 and fever 100.5 on admission.  CT abd/pelv showed perinephric fat stranding concerning for bilateral pyelonephritis.  - UCx showed multiple organisms likely contaminated, BCx no growth.  - Continue Zosyn q8H for likely infection with unclear source at this time - L flank pain, WBC 19 and fever 100.5 on admission.  CT abd/pelv showed perinephric fat stranding concerning for bilateral pyelonephritis.  - UCx no growth, BCx no growth.  - Continue Zosyn q8H for likely infection with unclear source at this time

## 2019-07-29 NOTE — PROGRESS NOTE ADULT - PROBLEM SELECTOR PLAN 4
Patient had recent salpingo-oophrectomy, may contribute to L flank pain, concern for abscess or ureteral injury.     - CT abd/pelv and CT urogram do not demonstrate abscess or intraabdominal process, no ureteral injury. Patient had recent right salpingo-oophrectomy     - CT abd/pelv and CT urogram do not demonstrate abscess or intraabdominal process, no ureteral injury.

## 2019-07-29 NOTE — PROGRESS NOTE ADULT - PROBLEM SELECTOR PLAN 2
- Home regimen Basaglar 12u qhs, humalog 4u before breakfast, 6u before lunch and dinner.  - Patient still not tolerating regular diet well, will encourage and monitor adequate PO fluids and change diet to softs.

## 2019-07-29 NOTE — PROGRESS NOTE ADULT - ASSESSMENT
Patient is a 49 yo woman with type 1 diabetes admitted for DKA found to have pyelonephritis (high medical decision making).      T1 DM, uncontrolled  - target bg 100-180 mg/dl  - increase lantus to 20 units sq qhs  - increase humalog to 6 units sq tid ac  - c/w humalog low correction scale ac and HS    DC planning- pt can follow up with her private endocrinologist.  Would be discharged on b/b- doses TBD

## 2019-07-29 NOTE — PROGRESS NOTE ADULT - PROBLEM SELECTOR PLAN 5
Patient states pain improved/different from flank pain experienced previously.    - Possibly constipation contributing, will initiate bowel regimen and re-examine.    - Tylenol ordered for mild pain, Toradol ordered for moderate/severe pain, attempt to avoid opioids.    - Will consider oxycodone if above does not adequately control pain.

## 2019-07-29 NOTE — PROGRESS NOTE ADULT - SUBJECTIVE AND OBJECTIVE BOX
Chief Complaint: t1dm    History:  denies n/v.  does not eat beef so eating an orange an dapple sauce for lunch.  no hypoglycemia    MEDICATIONS  (STANDING):  docusate sodium 100 milliGRAM(s) Oral two times a day  heparin  Injectable 5000 Unit(s) SubCutaneous every 8 hours  insulin glargine Injectable (LANTUS) 20 Unit(s) SubCutaneous at bedtime  insulin lispro (HumaLOG) corrective regimen sliding scale   SubCutaneous three times a day before meals  insulin lispro (HumaLOG) corrective regimen sliding scale   SubCutaneous at bedtime  insulin lispro Injectable (HumaLOG) 6 Unit(s) SubCutaneous three times a day before meals  piperacillin/tazobactam IVPB.. 3.375 Gram(s) IV Intermittent every 8 hours  senna      simethicone 80 milliGRAM(s) Chew every 12 hours    MEDICATIONS  (PRN):  acetaminophen   Tablet .. 650 milliGRAM(s) Oral every 6 hours PRN Mild Pain (1 - 3), Moderate Pain (4 - 6)  ketorolac   Injectable 15 milliGRAM(s) IV Push every 6 hours PRN Severe Pain (7 - 10)  ondansetron Injectable 4 milliGRAM(s) IV Push every 6 hours PRN Nausea and/or Vomiting      Allergies    No Known Allergies    Intolerances      Review of Systems:  Constitutional: No fever  Eyes: No blurry vision    ALL OTHER SYSTEMS REVIEWED AND NEGATIVE        PHYSICAL EXAM:  VITALS: T(C): 36.9 (07-29-19 @ 05:45)  T(F): 98.4 (07-29-19 @ 05:45), Max: 98.4 (07-28-19 @ 20:00)  HR: 69 (07-29-19 @ 05:45) (69 - 102)  BP: 114/51 (07-29-19 @ 05:45) (114/51 - 161/138)  RR:  (15 - 25)  SpO2:  (95% - 100%)  Wt(kg): --  GENERAL: NAD, well-groomed, well-developed  GI: Soft, nontender, non distended  SKIN: Dry, intact, No rashes or lesions  PSYCH: Alert and oriented x 3, normal affect, normal mood      CAPILLARY BLOOD GLUCOSE      POCT Blood Glucose.: 278 mg/dL (29 Jul 2019 12:35)  POCT Blood Glucose.: 278 mg/dL (29 Jul 2019 08:37)  POCT Blood Glucose.: 307 mg/dL (29 Jul 2019 06:14)  POCT Blood Glucose.: 358 mg/dL (29 Jul 2019 04:27)  POCT Blood Glucose.: 206 mg/dL (29 Jul 2019 00:02)  POCT Blood Glucose.: 95 mg/dL (28 Jul 2019 23:02)  POCT Blood Glucose.: 132 mg/dL (28 Jul 2019 22:01)  POCT Blood Glucose.: 153 mg/dL (28 Jul 2019 21:01)  POCT Blood Glucose.: 172 mg/dL (28 Jul 2019 19:52)  POCT Blood Glucose.: 171 mg/dL (28 Jul 2019 18:53)  POCT Blood Glucose.: 186 mg/dL (28 Jul 2019 18:08)  POCT Blood Glucose.: 183 mg/dL (28 Jul 2019 17:14)  POCT Blood Glucose.: 377 mg/dL (28 Jul 2019 17:11)  POCT Blood Glucose.: 191 mg/dL (28 Jul 2019 16:16)  POCT Blood Glucose.: 162 mg/dL (28 Jul 2019 14:55)      07-29    133<L>  |  98  |  5<L>  ----------------------------<  348<H>  4.2   |  22  |  0.79    EGFR if : 103  EGFR if non : 89    Ca    8.2<L>      07-29  Mg     1.7     07-29  Phos  2.7     07-29    TPro  8.6<H>  /  Alb  3.7  /  TBili  0.5  /  DBili  x   /  AST  6   /  ALT  6   /  AlkPhos  103  07-27          Thyroid Function Tests:      Hemoglobin A1C, Whole Blood: 9.6 % <H> [4.0 - 5.6] (07-23-19 @ 05:20)

## 2019-07-29 NOTE — PROGRESS NOTE ADULT - PROBLEM SELECTOR PLAN 6
Last bowel mvmt thursday    - Likely contribution of opioid medications    - initiated bowel regimen with senna + miralax

## 2019-07-29 NOTE — PROVIDER CONTACT NOTE (OTHER) - BACKGROUND
48 y.o. female admitted for diabetes ketoacidosis without coma. PMHx. of DM type 1. Patient a recent MICU transfer where she was on an insulin pump until it was d'c.

## 2019-07-29 NOTE — PROVIDER CONTACT NOTE (OTHER) - RECOMMENDATIONS
Notify MD, order coverage as you see needed, take into consideration patient will be receiving breakfast sliding scale coverage plus an additional 5units of standing insulin. Continue to monitor.

## 2019-07-29 NOTE — PROGRESS NOTE ADULT - ASSESSMENT
48F with history of T1DM, R eye enucleation, kidney stone, R ovarian cyst s/p salpingooophrectomy on 7/22/2019 admitted on 7/27 for L flank pain radiating to groin, N/V, and fever/chills, found to be in DKA in the ER with a positive UA.  Transferred from the MICU 7/28 after improvement and closure of AG.

## 2019-07-29 NOTE — CHART NOTE - NSCHARTNOTEFT_GEN_A_CORE
MICU COURSE:    47 yo F Hx T1DM, R eye enucleation, kidney stone, R ovarian cyst s/p salpingooophorectomy on 19 presented w/ L flank pain radiating to groin, N/V and fever/chills.  In ED, patient found to be in DKA and had positive U/A.  Patient transferred to MICU for management of DKA.    In MICU, patient started on insulin gtt and zosyn (for possible pyelonephritis based on CT). Patient's FS improved to less than 200 and patient was started on D5 1/2NS. Insulin gtt was titrated in order to close AG. Repeat BMPs on  demonstrated AG of 13 and 10. FS improved to 150s and patient was dosed with 16U lantus as per Endo. Patient ate dinner, taken off IVF and started on premeal and ISS with lantus. Patient is hemodynamically stable and ready for transfer to the floor.       PAST MEDICAL & SURGICAL HISTORY:  Diabetes mellitus type I  Cyst of right ovary  Diabetes  Eye injury  History of salpingoophorectomy: Right-sided  History of eye enucleation: right  H/O  section  Prosthetic eye globe   delivery delivered      FAMILY HISTORY:  No pertinent family history in first degree relatives  No pertinent family history in first degree relatives      SOCIAL HISTORY:  Smoking: [ ] Never Smoked [ ] Former Smoker (__ packs x ___ years) [ ] Current Smoker  (__ packs x ___ years)  Substance Use: [ ] Never Used [ ] Used ____  EtOH Use:  Marital Status: [ ] Single [ ]  [ ]  [ ]   Sexual History:   Occupation:  Recent Travel:  Country of Birth:  Advance Directives:    Allergies    No Known Allergies    Intolerances        HOME MEDICATIONS:    REVIEW OF SYSTEMS:  Constitutional: [ ] fevers [ ] chills [ ] weight loss [ ] weight gain  HEENT: [ ] dry eyes [ ] eye irritation [ ] postnasal drip [ ] nasal congestion  CV: [ ] chest pain [ ] orthopnea [ ] palpitations [ ] murmur  Resp: [ ] cough [ ] shortness of breath [ ] dyspnea [ ] wheezing [ ] sputum [ ] hemoptysis  GI: [ ] nausea [ ] vomiting [ ] diarrhea [ ] constipation [ ] abd pain [ ] dysphagia   : [ ] dysuria [ ] nocturia [ ] hematuria [ ] increased urinary frequency  Musculoskeletal: [ ] back pain [ ] myalgias [ ] arthralgias [ ] fracture  Skin: [ ] rash [ ] itch  Neurological: [ ] headache [ ] dizziness [ ] syncope [ ] weakness [ ] numbness  Psychiatric: [ ] anxiety [ ] depression  Endocrine: [ ] diabetes [ ] thyroid problem  Hematologic/Lymphatic: [ ] anemia [ ] bleeding problem  Allergic/Immunologic: [ ] itchy eyes [ ] nasal discharge [ ] hives [ ] angioedema  [ ] All other systems negative  [ ] Unable to assess ROS because ________    OBJECTIVE:  ICU Vital Signs Last 24 Hrs  T(C): 36.8 (2019 00:00), Max: 37.2 (2019 12:00)  T(F): 98.2 (2019 00:00), Max: 99 (2019 12:00)  HR: 89 (2019 00:00) (72 - 102)  BP: 128/66 (2019 00:00) (123/65 - 161/138)  BP(mean): 84 (2019 00:00) (75 - 147)  ABP: --  ABP(mean): --  RR: 21 (2019 00:00) (14 - 25)  SpO2: 98% (2019 00:00) (95% - 100%)         @ : @ 07:00  --------------------------------------------------------  IN: 956 mL / OUT: 800 mL / NET: 156 mL     @ 07: @ 01:44  --------------------------------------------------------  IN: 2502 mL / OUT: 1700 mL / NET: 802 mL      CAPILLARY BLOOD GLUCOSE      POCT Blood Glucose.: 206 mg/dL (2019 00:02)      PHYSICAL EXAM:  General:   HEENT:   Lymph Nodes:  Neck:   Respiratory:   Cardiovascular:   Abdomen:   Extremities:   Skin:   Neurological:  Psychiatry:    LINES:     HOSPITAL MEDICATIONS:  MEDICATIONS  (STANDING):  heparin  Injectable 5000 Unit(s) SubCutaneous every 8 hours  insulin glargine Injectable (LANTUS) 16 Unit(s) SubCutaneous at bedtime  insulin lispro (HumaLOG) corrective regimen sliding scale   SubCutaneous three times a day before meals  insulin lispro (HumaLOG) corrective regimen sliding scale   SubCutaneous at bedtime  insulin lispro Injectable (HumaLOG) 5 Unit(s) SubCutaneous three times a day before meals  piperacillin/tazobactam IVPB.. 3.375 Gram(s) IV Intermittent every 8 hours  simethicone 80 milliGRAM(s) Chew every 12 hours    MEDICATIONS  (PRN):  acetaminophen   Tablet .. 650 milliGRAM(s) Oral every 6 hours PRN Mild Pain (1 - 3), Moderate Pain (4 - 6)  HYDROmorphone  Injectable 1 milliGRAM(s) IV Push every 4 hours PRN Severe Pain (7 - 10)  ondansetron Injectable 4 milliGRAM(s) IV Push every 6 hours PRN Nausea and/or Vomiting      LABS:                        11.3   17.63 )-----------( 382      ( 2019 02:20 )             34.5     Hgb Trend: 11.3<--, 12.9<--, 12.9<--      136  |  99  |  4<L>  ----------------------------<  207<H>  3.3<L>   |  24  |  0.77    Ca    8.7      2019 17:30  Phos  2.4       Mg     1.6         TPro  8.6<H>  /  Alb  3.7  /  TBili  0.5  /  DBili  x   /  AST  6   /  ALT  6   /  AlkPhos  103      Creatinine Trend: 0.77<--, 0.78<--, 0.81<--, 0.74<--, 0.75<--, 0.81<--  PT/INR - ( 2019 16:53 )   PT: 12.6 SEC;   INR: 1.10          PTT - ( 2019 16:53 )  PTT:35.2 SEC  Urinalysis Basic - ( 2019 16:58 )    Color: LIGHT YELLOW / Appearance: CLEAR / S.018 / pH: 5.5  Gluc: >1000 / Ketone: >150  / Bili: NEGATIVE / Urobili: NORMAL   Blood: MODERATE / Protein: 20 / Nitrite: NEGATIVE   Leuk Esterase: SMALL / RBC: 0-2 / WBC 11-25   Sq Epi: OCC / Non Sq Epi: x / Bacteria: NEGATIVE        Venous Blood Gas:   @ 06:00  7.39/43/42/25/81.5  VBG Lactate: 1.4  Venous Blood Gas:   @ 02:20  7.38/39/163/23/99.5  VBG Lactate: 1.0  Venous Blood Gas:   @ 18:50  7.34/46/32/22/52.3  VBG Lactate: 0.9  Venous Blood Gas:   @ 16:27  7.26/51/17/19/23.6  VBG Lactate: 2.3      ASSESSMENT & PLAN:   47 yo woman w/ PMH of T1DM, R eye enucleation, kidney stone, right ovarian cyst s/p salpingo-oophorectomy 19 presenting with severe left flank pain and fever at home, found to be in DKA, also with leukocytosis, fever, CVA tenderness and CT suggestive of b/l pyelonephritis.        Neuro:   -uncomfortable due to pain  -Dilaudid 1 mg iv q4h for severe pain, has been controlling pain decently  -Tylenol prn for pain and fever    GI:   -CT A/P: heterogenous enhancement of kidneys w/ perinephric fat stranding, but no abscess or other acute intraabdominal process  -Zofran prn for nausea and vomiting    :  -UA w/ moderate blood, 0-2 RBCs, no bacteria, small LE, 10-25 WBCs, plus no urinary symptoms - overall not very suggestive of UTI, despite findings on CT suggestive of b/l pyelonephritis  -f/u UCx  -f/u CT urogram w/ contrast today to evaluate for ureteral injury from recent GYN procedure       Endo:  -DKA, ketones in blood/urine, initially w/ AG 23 -> down to 10  -transitioned off insulin gtt, dosed lantus 16U at 9:15pm on , gtt turned off at 11:15, at dinner  - started on 5U premeal with ISS as per endo recs, f/u further recs  -Home regimen: Basaglar 12 u qhs, Humalog 4 u before breakfast, 6 u before lunch and dinner    ID:  -WBCs 19 and fever to 100.5 on admission  -No findings on CT A/P suggestive of abscess or other post-operative complication  -CT showed heterogenous enhancement of kidneys w/ perinephric fat stranding, suggestive of b/l pyelonephritis, but UA not suggestive and pt denies urinary symptoms.    -f/u blood/urine cultures  -Continue with Zosyn    PPX:   -SQH q8h      For Follow-Up:  [ ] further endo recs  [ ] CT urogram  [ ] urine/blood cultures  [ ] f/u endo recs

## 2019-07-30 DIAGNOSIS — D63.8 ANEMIA IN OTHER CHRONIC DISEASES CLASSIFIED ELSEWHERE: ICD-10-CM

## 2019-07-30 LAB
ALBUMIN SERPL ELPH-MCNC: 3.1 G/DL — LOW (ref 3.3–5)
ALP SERPL-CCNC: 81 U/L — SIGNIFICANT CHANGE UP (ref 40–120)
ALT FLD-CCNC: 5 U/L — SIGNIFICANT CHANGE UP (ref 4–33)
ANION GAP SERPL CALC-SCNC: 12 MMO/L — SIGNIFICANT CHANGE UP (ref 7–14)
AST SERPL-CCNC: 5 U/L — SIGNIFICANT CHANGE UP (ref 4–32)
BASOPHILS # BLD AUTO: 0.02 K/UL — SIGNIFICANT CHANGE UP (ref 0–0.2)
BASOPHILS NFR BLD AUTO: 0.2 % — SIGNIFICANT CHANGE UP (ref 0–2)
BILIRUB SERPL-MCNC: 0.5 MG/DL — SIGNIFICANT CHANGE UP (ref 0.2–1.2)
BUN SERPL-MCNC: 5 MG/DL — LOW (ref 7–23)
CALCIUM SERPL-MCNC: 9 MG/DL — SIGNIFICANT CHANGE UP (ref 8.4–10.5)
CHLORIDE SERPL-SCNC: 96 MMOL/L — LOW (ref 98–107)
CO2 SERPL-SCNC: 29 MMOL/L — SIGNIFICANT CHANGE UP (ref 22–31)
CREAT SERPL-MCNC: 0.82 MG/DL — SIGNIFICANT CHANGE UP (ref 0.5–1.3)
EOSINOPHIL # BLD AUTO: 0.03 K/UL — SIGNIFICANT CHANGE UP (ref 0–0.5)
EOSINOPHIL NFR BLD AUTO: 0.4 % — SIGNIFICANT CHANGE UP (ref 0–6)
FERRITIN SERPL-MCNC: 248.2 NG/ML — HIGH (ref 15–150)
GLUCOSE SERPL-MCNC: 219 MG/DL — HIGH (ref 70–99)
HCT VFR BLD CALC: 30.1 % — LOW (ref 34.5–45)
HGB BLD-MCNC: 10 G/DL — LOW (ref 11.5–15.5)
IMM GRANULOCYTES NFR BLD AUTO: 0.5 % — SIGNIFICANT CHANGE UP (ref 0–1.5)
IRON SATN MFR SERPL: 131 UG/DL — LOW (ref 140–530)
IRON SATN MFR SERPL: 50 UG/DL — SIGNIFICANT CHANGE UP (ref 30–160)
LDH SERPL L TO P-CCNC: 120 U/L — LOW (ref 135–225)
LYMPHOCYTES # BLD AUTO: 1.28 K/UL — SIGNIFICANT CHANGE UP (ref 1–3.3)
LYMPHOCYTES # BLD AUTO: 15.4 % — SIGNIFICANT CHANGE UP (ref 13–44)
MAGNESIUM SERPL-MCNC: 1.8 MG/DL — SIGNIFICANT CHANGE UP (ref 1.6–2.6)
MCHC RBC-ENTMCNC: 28 PG — SIGNIFICANT CHANGE UP (ref 27–34)
MCHC RBC-ENTMCNC: 33.2 % — SIGNIFICANT CHANGE UP (ref 32–36)
MCV RBC AUTO: 84.3 FL — SIGNIFICANT CHANGE UP (ref 80–100)
MONOCYTES # BLD AUTO: 0.78 K/UL — SIGNIFICANT CHANGE UP (ref 0–0.9)
MONOCYTES NFR BLD AUTO: 9.4 % — SIGNIFICANT CHANGE UP (ref 2–14)
NEUTROPHILS # BLD AUTO: 6.14 K/UL — SIGNIFICANT CHANGE UP (ref 1.8–7.4)
NEUTROPHILS NFR BLD AUTO: 74.1 % — SIGNIFICANT CHANGE UP (ref 43–77)
NRBC # FLD: 0 K/UL — SIGNIFICANT CHANGE UP (ref 0–0)
PHOSPHATE SERPL-MCNC: 2.4 MG/DL — LOW (ref 2.5–4.5)
PLATELET # BLD AUTO: 425 K/UL — HIGH (ref 150–400)
PMV BLD: 11 FL — SIGNIFICANT CHANGE UP (ref 7–13)
POTASSIUM SERPL-MCNC: 3.5 MMOL/L — SIGNIFICANT CHANGE UP (ref 3.5–5.3)
POTASSIUM SERPL-SCNC: 3.5 MMOL/L — SIGNIFICANT CHANGE UP (ref 3.5–5.3)
PROT SERPL-MCNC: 7 G/DL — SIGNIFICANT CHANGE UP (ref 6–8.3)
RBC # BLD: 3.57 M/UL — LOW (ref 3.8–5.2)
RBC # FLD: 11.9 % — SIGNIFICANT CHANGE UP (ref 10.3–14.5)
SODIUM SERPL-SCNC: 137 MMOL/L — SIGNIFICANT CHANGE UP (ref 135–145)
UIBC SERPL-MCNC: 81 UG/DL — LOW (ref 110–370)
WBC # BLD: 8.29 K/UL — SIGNIFICANT CHANGE UP (ref 3.8–10.5)
WBC # FLD AUTO: 8.29 K/UL — SIGNIFICANT CHANGE UP (ref 3.8–10.5)

## 2019-07-30 PROCEDURE — 99232 SBSQ HOSP IP/OBS MODERATE 35: CPT

## 2019-07-30 PROCEDURE — 74018 RADEX ABDOMEN 1 VIEW: CPT | Mod: 26

## 2019-07-30 PROCEDURE — 99233 SBSQ HOSP IP/OBS HIGH 50: CPT

## 2019-07-30 RX ORDER — INSULIN GLARGINE 100 [IU]/ML
23 INJECTION, SOLUTION SUBCUTANEOUS AT BEDTIME
Refills: 0 | Status: DISCONTINUED | OUTPATIENT
Start: 2019-07-30 | End: 2019-08-01

## 2019-07-30 RX ORDER — SODIUM CHLORIDE 9 MG/ML
1000 INJECTION, SOLUTION INTRAVENOUS
Refills: 0 | Status: DISCONTINUED | OUTPATIENT
Start: 2019-07-30 | End: 2019-07-31

## 2019-07-30 RX ADMIN — Medication 3: at 08:44

## 2019-07-30 RX ADMIN — INSULIN GLARGINE 23 UNIT(S): 100 INJECTION, SOLUTION SUBCUTANEOUS at 22:31

## 2019-07-30 RX ADMIN — ONDANSETRON 4 MILLIGRAM(S): 8 TABLET, FILM COATED ORAL at 23:43

## 2019-07-30 RX ADMIN — SIMETHICONE 80 MILLIGRAM(S): 80 TABLET, CHEWABLE ORAL at 17:41

## 2019-07-30 RX ADMIN — PIPERACILLIN AND TAZOBACTAM 25 GRAM(S): 4; .5 INJECTION, POWDER, LYOPHILIZED, FOR SOLUTION INTRAVENOUS at 08:44

## 2019-07-30 RX ADMIN — SENNA PLUS 2 TABLET(S): 8.6 TABLET ORAL at 12:21

## 2019-07-30 RX ADMIN — SIMETHICONE 80 MILLIGRAM(S): 80 TABLET, CHEWABLE ORAL at 06:06

## 2019-07-30 RX ADMIN — Medication 1 ENEMA: at 16:44

## 2019-07-30 RX ADMIN — Medication 100 MILLIGRAM(S): at 06:06

## 2019-07-30 RX ADMIN — Medication 100 MILLIGRAM(S): at 17:41

## 2019-07-30 RX ADMIN — Medication 2: at 17:41

## 2019-07-30 RX ADMIN — PIPERACILLIN AND TAZOBACTAM 25 GRAM(S): 4; .5 INJECTION, POWDER, LYOPHILIZED, FOR SOLUTION INTRAVENOUS at 17:41

## 2019-07-30 RX ADMIN — Medication 1: at 12:21

## 2019-07-30 RX ADMIN — Medication 6 UNIT(S): at 08:44

## 2019-07-30 RX ADMIN — Medication 6 UNIT(S): at 17:42

## 2019-07-30 RX ADMIN — Medication 6 UNIT(S): at 12:22

## 2019-07-30 NOTE — PROGRESS NOTE ADULT - PROBLEM SELECTOR PLAN 6
Patient had recent right salpingo-oophrectomy     - CT abd/pelv and CT urogram do not demonstrate abscess or intraabdominal process, no ureteral injury.

## 2019-07-30 NOTE — PROGRESS NOTE ADULT - PROBLEM SELECTOR PLAN 4
Last bowel mvmt thursday    - Likely contribution of opioid medications    - initiated bowel regimen with senna + miralax without relief    - Gave saline enema and began duclolax BID regimen, encouraged OOB.

## 2019-07-30 NOTE — PROGRESS NOTE ADULT - PROBLEM SELECTOR PLAN 3
- L flank pain, WBC 19 and fever 100.5 on admission.  CT abd/pelv showed perinephric fat stranding concerning for bilateral pyelonephritis.  - UCx no growth, BCx no growth.  - Continue Zosyn q8H Day 4 for likely infection with unclear source at this time.  - Will transition to oral antibiotics once patient is tolerating PO better

## 2019-07-30 NOTE — PROGRESS NOTE ADULT - SUBJECTIVE AND OBJECTIVE BOX
Patient is a 48y old  Female who presents with a chief complaint of DKA, pyelonephritis (2019 14:43)      INTERVAL HPI/OVERNIGHT EVENTS:  Patient continues to have worsening abdominal pain and has not had bowel movement despite initiation of bowel regimen.  No flank pain today.  She is passing gas and thinks this may be helping abdominal pain slightly; she is ambulating to attempt to move bowels.    T(C): 36.7 (19 @ 13:34), Max: 37 (19 @ 05:29)  HR: 87 (19 @ 13:34) (73 - 87)  BP: 134/71 (19 @ 13:34) (134/71 - 147/75)  RR: 16 (19 @ 13:34) (16 - 17)  SpO2: 100% (19 @ 13:34) (96% - 100%)  Wt(kg): --  I&O's Summary      PHYSICAL EXAM:  GENERAL: Lying in bed comfortably in NAD  HEAD:  Atraumatic, Normocephalic  EYES: PERRL, EOMs intact b/l   ENMT: Moist Mucus membranes  NERVOUS SYSTEM:  A&Ox4, Normal Motor strenght in b/l Upper and lower extremities, gross sensation to light tough intact in b/l upper and lower extremities, CNs II-XII intact b/l w/out focal defcitis, EOMs intact, with enucleated pupil on right.  CHEST/LUNG: CTAB no w/r/r  HEART: RRR no m/g/r  ABDOMEN: +BS, soft, nondistended, tenderness diffuse worst in LUQ  EXTREMITIES:  +2 radial pulses b/l, no LE edema  LYMPH: No anterior/Posterior or supraclavicular LAD  SKIN: No new rashes or DTIs, warm, dry, and intact    PAST MEDICAL & SURGICAL HISTORY:  Diabetes mellitus type I  Cyst of right ovary  Diabetes  Eye injury  History of salpingoophorectomy: Right-sided  History of eye enucleation: right  H/O  section  Prosthetic eye globe   delivery delivered      MEDICATIONS  (STANDING):  docusate sodium 100 milliGRAM(s) Oral two times a day  heparin  Injectable 5000 Unit(s) SubCutaneous every 8 hours  insulin glargine Injectable (LANTUS) 23 Unit(s) SubCutaneous at bedtime  insulin lispro (HumaLOG) corrective regimen sliding scale   SubCutaneous three times a day before meals  insulin lispro (HumaLOG) corrective regimen sliding scale   SubCutaneous at bedtime  insulin lispro Injectable (HumaLOG) 6 Unit(s) SubCutaneous three times a day before meals  lactated ringers. 1000 milliLiter(s) (75 mL/Hr) IV Continuous <Continuous>  piperacillin/tazobactam IVPB.. 3.375 Gram(s) IV Intermittent every 8 hours  saline laxative (FLEET) Rectal Enema 1 Enema Rectal once  senna 2 Tablet(s) Oral daily  senna      simethicone 80 milliGRAM(s) Chew every 12 hours    MEDICATIONS  (PRN):  acetaminophen   Tablet .. 650 milliGRAM(s) Oral every 6 hours PRN Mild Pain (1 - 3), Moderate Pain (4 - 6)  bisacodyl 5 milliGRAM(s) Oral every 12 hours PRN Constipation  ketorolac   Injectable 15 milliGRAM(s) IV Push every 6 hours PRN Severe Pain (7 - 10)  ondansetron Injectable 4 milliGRAM(s) IV Push every 6 hours PRN Nausea and/or Vomiting      REVIEW OF SYSTEMS:  GENERAL: No fever, no chills, no night sweats, no weight gain or loss  EYES: no change in vision, no blurred vision  HEENT: no trouble swallowing, no trouble speaking, no congestion, no sore throat  NECK: no pain or stiffness  CV: no chest pain or palpitations  RESP: no cough, no shortness of breath  GI: +abdominal pain in epigastrum and LUQ, no nausea, no vomiting, no diarrhea, +constipation, no BRBPR or melena  : No dysuria, +frequency  NEURO: no headache, no weakness, no dizziness  SKIN: no rashes  HEME: No easy bruising or bleeding  MSK: No joint pain      LABS:                        10.0   8.29  )-----------( 425      ( 2019 06:40 )             30.1     07-30    137  |  96<L>  |  5<L>  ----------------------------<  219<H>  3.5   |  29  |  0.82    Ca    9.0      2019 06:40  Phos  2.4     07-30  Mg     1.8         TPro  7.0  /  Alb  3.1<L>  /  TBili  0.5  /  DBili  x   /  AST  5   /  ALT  5   /  AlkPhos  81      LIVER FUNCTIONS - ( 2019 06:40 )  Alb: 3.1 g/dL / Pro: 7.0 g/dL / ALK PHOS: 81 u/L / ALT: 5 u/L / AST: 5 u/L / GGT: x     / T. Bili 0.5 mg/dL / D. Bili x             CAPILLARY BLOOD GLUCOSE    POCT Blood Glucose.: 186 mg/dL (2019 12:05)  POCT Blood Glucose.: 268 mg/dL (2019 08:34)  POCT Blood Glucose.: 225 mg/dL (2019 21:36)  POCT Blood Glucose.: 181 mg/dL (2019 17:30)

## 2019-07-30 NOTE — PROGRESS NOTE ADULT - PROBLEM SELECTOR PLAN 1
- In ED found to have ketones in urine and AG of 23.  Initial AG 23, down to 10 on MICU transfer to floor.    - Transitioned off insulin gtt with lantus dosed at 16U (given at 21:15, gtt off at 23:15).  5U with meals per endo recs.  Increased 7/29 to 20U lantus and 6U with meals due to persistent hyperglycemia.

## 2019-07-30 NOTE — PROGRESS NOTE ADULT - PROBLEM SELECTOR PLAN 5
- Patient had 2.5g drop in Hgb, no active source of bleed.  May be dilutional.   - Labwork for hemolysis obtained, no sign of hemolysis, iron studies consistent with anemia of chronic inflammatory disease.

## 2019-07-30 NOTE — PROGRESS NOTE ADULT - SUBJECTIVE AND OBJECTIVE BOX
Chief Complaint: t1dm    History:  vomited this am, but ate breakfast.  no hypoglycemia    MEDICATIONS  (STANDING):  docusate sodium 100 milliGRAM(s) Oral two times a day  heparin  Injectable 5000 Unit(s) SubCutaneous every 8 hours  insulin glargine Injectable (LANTUS) 20 Unit(s) SubCutaneous at bedtime  insulin lispro (HumaLOG) corrective regimen sliding scale   SubCutaneous three times a day before meals  insulin lispro (HumaLOG) corrective regimen sliding scale   SubCutaneous at bedtime  insulin lispro Injectable (HumaLOG) 6 Unit(s) SubCutaneous three times a day before meals  piperacillin/tazobactam IVPB.. 3.375 Gram(s) IV Intermittent every 8 hours  senna      simethicone 80 milliGRAM(s) Chew every 12 hours    MEDICATIONS  (PRN):  acetaminophen   Tablet .. 650 milliGRAM(s) Oral every 6 hours PRN Mild Pain (1 - 3), Moderate Pain (4 - 6)  ketorolac   Injectable 15 milliGRAM(s) IV Push every 6 hours PRN Severe Pain (7 - 10)  ondansetron Injectable 4 milliGRAM(s) IV Push every 6 hours PRN Nausea and/or Vomiting      Allergies    No Known Allergies    Intolerances      Review of Systems:  Constitutional: No fever  Eyes: No blurry vision    ALL OTHER SYSTEMS REVIEWED AND NEGATIVE        Vital Signs Last 24 Hrs  T(C): 36.7 (30 Jul 2019 13:34), Max: 37 (30 Jul 2019 05:29)  T(F): 98.1 (30 Jul 2019 13:34), Max: 98.6 (30 Jul 2019 05:29)  HR: 87 (30 Jul 2019 13:34) (73 - 87)  BP: 134/71 (30 Jul 2019 13:34) (134/71 - 147/75)  BP(mean): --  RR: 16 (30 Jul 2019 13:34) (16 - 17)  SpO2: 100% (30 Jul 2019 13:34) (96% - 100%)  GENERAL: NAD, well-groomed, well-developed  GI: Soft, nontender, non distended  SKIN: Dry, intact, No rashes or lesions  PSYCH: Alert and oriented x 3, normal affect, normal mood      CAPILLARY BLOOD GLUCOSE    POCT Blood Glucose.: 186 mg/dL (30 Jul 2019 12:05)  POCT Blood Glucose.: 268 mg/dL (30 Jul 2019 08:34)  POCT Blood Glucose.: 225 mg/dL (29 Jul 2019 21:36)  POCT Blood Glucose.: 181 mg/dL (29 Jul 2019 17:30)    POCT Blood Glucose.: 278 mg/dL (29 Jul 2019 12:35)  POCT Blood Glucose.: 278 mg/dL (29 Jul 2019 08:37)  POCT Blood Glucose.: 307 mg/dL (29 Jul 2019 06:14)  POCT Blood Glucose.: 358 mg/dL (29 Jul 2019 04:27)  POCT Blood Glucose.: 206 mg/dL (29 Jul 2019 00:02)  POCT Blood Glucose.: 95 mg/dL (28 Jul 2019 23:02)  POCT Blood Glucose.: 132 mg/dL (28 Jul 2019 22:01)  POCT Blood Glucose.: 153 mg/dL (28 Jul 2019 21:01)  POCT Blood Glucose.: 172 mg/dL (28 Jul 2019 19:52)  POCT Blood Glucose.: 171 mg/dL (28 Jul 2019 18:53)  POCT Blood Glucose.: 186 mg/dL (28 Jul 2019 18:08)  POCT Blood Glucose.: 183 mg/dL (28 Jul 2019 17:14)  POCT Blood Glucose.: 377 mg/dL (28 Jul 2019 17:11)  POCT Blood Glucose.: 191 mg/dL (28 Jul 2019 16:16)  POCT Blood Glucose.: 162 mg/dL (28 Jul 2019 14:55)      07-29    133<L>  |  98  |  5<L>  ----------------------------<  348<H>  4.2   |  22  |  0.79    EGFR if : 103  EGFR if non : 89    Ca    8.2<L>      07-29  Mg     1.7     07-29  Phos  2.7     07-29    TPro  8.6<H>  /  Alb  3.7  /  TBili  0.5  /  DBili  x   /  AST  6   /  ALT  6   /  AlkPhos  103  07-27          Thyroid Function Tests:      Hemoglobin A1C, Whole Blood: 9.6 % <H> [4.0 - 5.6] (07-23-19 @ 05:20)

## 2019-07-30 NOTE — PROGRESS NOTE ADULT - ASSESSMENT
Patient is a 49 yo woman with type 1 diabetes admitted for DKA found to have pyelonephritis (high medical decision making).      T1 DM, uncontrolled  - target bg 100-180 mg/dl  - increase lantus to 23 units sq qhs  - c/w humalog to 6 units sq tid ac  - c/w humalog low correction scale ac and HS    DC planning- pt can follow up with her private endocrinologist.  Would be discharged on b/b- doses TBD

## 2019-07-31 ENCOUNTER — TRANSCRIPTION ENCOUNTER (OUTPATIENT)
Age: 49
End: 2019-07-31

## 2019-07-31 LAB
ALBUMIN SERPL ELPH-MCNC: 3.2 G/DL — LOW (ref 3.3–5)
ALP SERPL-CCNC: 77 U/L — SIGNIFICANT CHANGE UP (ref 40–120)
ALT FLD-CCNC: 5 U/L — SIGNIFICANT CHANGE UP (ref 4–33)
ANION GAP SERPL CALC-SCNC: 12 MMO/L — SIGNIFICANT CHANGE UP (ref 7–14)
AST SERPL-CCNC: 6 U/L — SIGNIFICANT CHANGE UP (ref 4–32)
BASOPHILS # BLD AUTO: 0.03 K/UL — SIGNIFICANT CHANGE UP (ref 0–0.2)
BASOPHILS NFR BLD AUTO: 0.4 % — SIGNIFICANT CHANGE UP (ref 0–2)
BILIRUB SERPL-MCNC: 0.4 MG/DL — SIGNIFICANT CHANGE UP (ref 0.2–1.2)
BUN SERPL-MCNC: 4 MG/DL — LOW (ref 7–23)
CALCIUM SERPL-MCNC: 9.4 MG/DL — SIGNIFICANT CHANGE UP (ref 8.4–10.5)
CHLORIDE SERPL-SCNC: 96 MMOL/L — LOW (ref 98–107)
CO2 SERPL-SCNC: 30 MMOL/L — SIGNIFICANT CHANGE UP (ref 22–31)
CREAT SERPL-MCNC: 0.85 MG/DL — SIGNIFICANT CHANGE UP (ref 0.5–1.3)
EOSINOPHIL # BLD AUTO: 0.02 K/UL — SIGNIFICANT CHANGE UP (ref 0–0.5)
EOSINOPHIL NFR BLD AUTO: 0.3 % — SIGNIFICANT CHANGE UP (ref 0–6)
GLUCOSE SERPL-MCNC: 135 MG/DL — HIGH (ref 70–99)
HCT VFR BLD CALC: 30.8 % — LOW (ref 34.5–45)
HGB BLD-MCNC: 10.2 G/DL — LOW (ref 11.5–15.5)
IMM GRANULOCYTES NFR BLD AUTO: 0.4 % — SIGNIFICANT CHANGE UP (ref 0–1.5)
LYMPHOCYTES # BLD AUTO: 1.35 K/UL — SIGNIFICANT CHANGE UP (ref 1–3.3)
LYMPHOCYTES # BLD AUTO: 18.7 % — SIGNIFICANT CHANGE UP (ref 13–44)
MCHC RBC-ENTMCNC: 28.1 PG — SIGNIFICANT CHANGE UP (ref 27–34)
MCHC RBC-ENTMCNC: 33.1 % — SIGNIFICANT CHANGE UP (ref 32–36)
MCV RBC AUTO: 84.8 FL — SIGNIFICANT CHANGE UP (ref 80–100)
MONOCYTES # BLD AUTO: 0.63 K/UL — SIGNIFICANT CHANGE UP (ref 0–0.9)
MONOCYTES NFR BLD AUTO: 8.7 % — SIGNIFICANT CHANGE UP (ref 2–14)
NEUTROPHILS # BLD AUTO: 5.17 K/UL — SIGNIFICANT CHANGE UP (ref 1.8–7.4)
NEUTROPHILS NFR BLD AUTO: 71.5 % — SIGNIFICANT CHANGE UP (ref 43–77)
NRBC # FLD: 0.02 K/UL — SIGNIFICANT CHANGE UP (ref 0–0)
PLATELET # BLD AUTO: 464 K/UL — HIGH (ref 150–400)
PMV BLD: 10.9 FL — SIGNIFICANT CHANGE UP (ref 7–13)
POTASSIUM SERPL-MCNC: 3.5 MMOL/L — SIGNIFICANT CHANGE UP (ref 3.5–5.3)
POTASSIUM SERPL-SCNC: 3.5 MMOL/L — SIGNIFICANT CHANGE UP (ref 3.5–5.3)
PROT SERPL-MCNC: 7.2 G/DL — SIGNIFICANT CHANGE UP (ref 6–8.3)
RBC # BLD: 3.63 M/UL — LOW (ref 3.8–5.2)
RBC # FLD: 12 % — SIGNIFICANT CHANGE UP (ref 10.3–14.5)
SODIUM SERPL-SCNC: 138 MMOL/L — SIGNIFICANT CHANGE UP (ref 135–145)
WBC # BLD: 7.23 K/UL — SIGNIFICANT CHANGE UP (ref 3.8–10.5)
WBC # FLD AUTO: 7.23 K/UL — SIGNIFICANT CHANGE UP (ref 3.8–10.5)

## 2019-07-31 PROCEDURE — 99233 SBSQ HOSP IP/OBS HIGH 50: CPT

## 2019-07-31 RX ADMIN — Medication 100 MILLIGRAM(S): at 06:57

## 2019-07-31 RX ADMIN — Medication 2: at 17:59

## 2019-07-31 RX ADMIN — Medication 6 UNIT(S): at 13:12

## 2019-07-31 RX ADMIN — SENNA PLUS 2 TABLET(S): 8.6 TABLET ORAL at 13:13

## 2019-07-31 RX ADMIN — Medication 6 UNIT(S): at 18:00

## 2019-07-31 RX ADMIN — HEPARIN SODIUM 5000 UNIT(S): 5000 INJECTION INTRAVENOUS; SUBCUTANEOUS at 22:35

## 2019-07-31 RX ADMIN — PIPERACILLIN AND TAZOBACTAM 25 GRAM(S): 4; .5 INJECTION, POWDER, LYOPHILIZED, FOR SOLUTION INTRAVENOUS at 08:56

## 2019-07-31 RX ADMIN — PIPERACILLIN AND TAZOBACTAM 25 GRAM(S): 4; .5 INJECTION, POWDER, LYOPHILIZED, FOR SOLUTION INTRAVENOUS at 01:22

## 2019-07-31 RX ADMIN — SIMETHICONE 80 MILLIGRAM(S): 80 TABLET, CHEWABLE ORAL at 06:57

## 2019-07-31 RX ADMIN — Medication 6 UNIT(S): at 08:55

## 2019-07-31 RX ADMIN — Medication 1 TABLET(S): at 18:00

## 2019-07-31 RX ADMIN — Medication 1: at 08:55

## 2019-07-31 RX ADMIN — Medication 1: at 13:12

## 2019-07-31 RX ADMIN — INSULIN GLARGINE 23 UNIT(S): 100 INJECTION, SOLUTION SUBCUTANEOUS at 22:35

## 2019-07-31 RX ADMIN — ONDANSETRON 4 MILLIGRAM(S): 8 TABLET, FILM COATED ORAL at 13:13

## 2019-07-31 NOTE — DISCHARGE NOTE PROVIDER - NSDCCPCAREPLAN_GEN_ALL_CORE_FT
PRINCIPAL DISCHARGE DIAGNOSIS  Diagnosis: DKA (diabetic ketoacidosis)  Assessment and Plan of Treatment:       SECONDARY DISCHARGE DIAGNOSES  Diagnosis: Pyelonephritis  Assessment and Plan of Treatment: You had flank pain when you came into our Emergency Department, and a CT scan showed changes concerning for an infection of your kidneys called pyelonephritis.  We gave you IV antibiotics in the hospital and would like you to complete a total of 10 days of antibiotics, which means your last day will be August 5.  We will write you a prescription for this which can be picked up at your pharmacy.    Diagnosis: Constipation, unspecified constipation type  Assessment and Plan of Treatment: You had constipation while hospitalized that caused you significant abdominal pain.  Please continue an over-the-counter bowel regimen consisting of Miralax and Sennosides or Dulcolax for several weeks to avoid further constipation.    Diagnosis: Anemia, chronic disease  Assessment and Plan of Treatment: You were found to have a low blood count, and lab tests indicate that this is probably related to your chronic medical problem - most likely diabetes.  The best way to control this problem is optimal control of your diabetes. PRINCIPAL DISCHARGE DIAGNOSIS  Diagnosis: DKA (diabetic ketoacidosis)  Assessment and Plan of Treatment: You came in with diabetic ketoacidosis due to a possible infection, and not taking your insulin.  This is a very dangerous life-threatening condition.  We checked your A1c (a measure of long-term glucose control) and found this was elevated at 9.6 (normal is <7.0).  We increased your long-term basal insulin to 20U Lantus daily at bedtime.  You should take measure your glucose with a fingerstick, and take 4U of short acting bolus insulin before all meals.  You can continue to use the same sliding scale insulin regimen you have been using, correcting with 1U insulin per 50 mg/dL glucose.  You should keep a close log of your blood sugar measurements, and follow-up with your endocrinologist within 1-2 weeks for further adjustments to your insulin regimen.      SECONDARY DISCHARGE DIAGNOSES  Diagnosis: Gastroparesis  Assessment and Plan of Treatment: We learned that your abdominal pain may be due to some damage to the nerves of your stomach, which can cause delayed emptying, a problem called gastroparesis.  You should call the GI clinic and follow-up with GI within 1-2 weeks.  Until that appointment, you should take Reglan as prescribed, every 8 hours, to help your stomach empty and for nausea.  You may take the prescribed Zofran if you have additional nausea.    Diagnosis: Pyelonephritis  Assessment and Plan of Treatment: You had flank pain when you came into our Emergency Department, and a CT scan showed changes concerning for an infection of your kidneys called pyelonephritis.  We gave you IV antibiotics in the hospital and you completed a 7 day course of IV antibiotics.    Diagnosis: Constipation, unspecified constipation type  Assessment and Plan of Treatment: You had constipation while hospitalized that caused you significant abdominal pain.  Please continue a bowel regimen consisting of Miralax for several weeks to avoid further constipation.    Diagnosis: Anemia, chronic disease  Assessment and Plan of Treatment: You were found to have a low blood count, and lab tests indicate that this is probably related to your chronic medical problem - most likely diabetes.  The best way to control this problem is optimal control of your diabetes. PRINCIPAL DISCHARGE DIAGNOSIS  Diagnosis: DKA (diabetic ketoacidosis)  Assessment and Plan of Treatment: You came in with diabetic ketoacidosis due to a possible infection, and not taking your insulin.  This is a very dangerous life-threatening condition.  We checked your A1c (a measure of long-term glucose control) and found this was elevated at 9.6 (normal is <7.0).  We increased your long-term basal insulin to 20U Lantus daily at bedtime.  You should take measure your glucose with a fingerstick, and take 4U of short acting bolus insulin before all meals.  You can continue to use the same sliding scale insulin regimen you have been using, correcting with 1U insulin per 50 mg/dL glucose.  You should keep a close log of your blood sugar measurements, and follow-up with your endocrinologist within 1-2 weeks for further adjustments to your insulin regimen.      SECONDARY DISCHARGE DIAGNOSES  Diagnosis: Gastroparesis  Assessment and Plan of Treatment: We learned that your abdominal pain may be due to some damage to the nerves of your stomach, which can cause delayed emptying, a problem called gastroparesis.  You should call the GI clinic and follow-up with GI within 1-2 weeks.  Until that appointment, you should take Reglan as prescribed, every 8 hours, to help your stomach empty and for nausea.  You may take the prescribed Zofran if you have additional nausea that you may use if needed.    Diagnosis: Constipation, unspecified constipation type  Assessment and Plan of Treatment: You had constipation while hospitalized that caused you significant abdominal pain.  Please continue a bowel regimen consisting of Miralax for several weeks to avoid further constipation.    Diagnosis: Anemia, chronic disease  Assessment and Plan of Treatment: You were found to have a low blood count, and lab tests indicate that this is probably related to your chronic medical problem - most likely diabetes.  The best way to control this problem is optimal control of your diabetes.    Diagnosis: Pyelonephritis  Assessment and Plan of Treatment: You had flank pain when you came into our Emergency Department, and a CT scan showed changes concerning for an infection of your kidneys called pyelonephritis.  We gave you IV antibiotics in the hospital and you completed a 7 day course of IV antibiotics.

## 2019-07-31 NOTE — PROGRESS NOTE ADULT - PROBLEM SELECTOR PLAN 1
- In ED found to have ketones in urine and AG of 23.  Initial AG 23, down to 10 on MICU transfer to floor.    - Transitioned off insulin gtt with lantus dosed at 16U (given at 21:15, gtt off at 23:15).  5U with meals per endo recs.  Increased 7/29 to 20U lantus and 6U with meals due to persistent hyperglycemia.  Increased 7/30 to 23U lantus and 6U with meals, with improvement in sugars. -Resolved

## 2019-07-31 NOTE — DISCHARGE NOTE PROVIDER - NSDCFUADDINST_GEN_ALL_CORE_FT
Please follow-up with your endocrinologist within 1-2 weeks, our [facility] information is provided if you require a new endocrinologist.      Please follow-up with GI within 1-2 weeks.

## 2019-07-31 NOTE — PROGRESS NOTE ADULT - PROBLEM SELECTOR PLAN 4
Last bowel mvmt thursday    - Likely contribution of opioid medications    - initiated bowel regimen with senna + miralax without relief    - Gave saline enema and began duclolax BID regimen, encouraged OOB, patient experienced relief and continued to have soft stools with this regimen, will continue. Last bowel mvmt thursday    - Likely contribution of opioid medications    - initiated bowel regimen with senna + miralax without relief    - Gave saline enema and began duclolax BID regimen, encouraged OOB, patient experienced relief and continued to have soft stools with this regimen, will continue dulcolax. Last bowel mvmt thursday    - Gave saline enema and began duclolax BID regimen, encouraged OOB, patient experienced relief and continued to have soft stools with this regimen, will continue dulcolax.

## 2019-07-31 NOTE — DISCHARGE NOTE PROVIDER - PROVIDER TOKENS
FREE:[LAST:[Minus],FIRST:[Leisa],PHONE:[phone]],FAX:[(   [phone]    -],ADDRESS:[[address] [place] [address]]]

## 2019-07-31 NOTE — DISCHARGE NOTE PROVIDER - CARE PROVIDER_API CALL
Leisa Shannon  [address] [facility] [place] [address]  Phone: [phone]  Fax: (   )    -  Follow Up Time:

## 2019-07-31 NOTE — DISCHARGE NOTE PROVIDER - NSFOLLOWUPCLINICS_GEN_ALL_ED_FT
[facility]  Endocrinology  [address] [place] [address]  Phone: [phone]  Fax:   Follow Up Time:     [facility]  Gastroenterology  [address] [facility] [address]  [place] [address]  Phone: [phone]  Fax:   Follow Up Time:

## 2019-07-31 NOTE — PROGRESS NOTE ADULT - PROBLEM SELECTOR PLAN 3
- L flank pain, WBC 19 and fever 100.5 on admission.  CT abd/pelv showed perinephric fat stranding concerning for bilateral pyelonephritis.  - UCx no growth, BCx no growth.  - Continue Zosyn q8H Day 4 for likely infection with unclear source at this time.  - Will transition to oral antibiotics once patient is tolerating PO better*** - L flank pain, WBC 19 and fever 100.5 on admission.  CT abd/pelv showed perinephric fat stranding concerning for bilateral pyelonephritis.  - UCx no growth, BCx no growth.  - Discontinue Zosyn q8H Day 5/10 for likely infection with unclear source at this time.  - Will transition to oral Augmentin BID 7/31 for total 10 day course of antibiotics, ending 8/5/2019 - L flank pain, WBC 19 and fever 100.5 on admission.  CT abd/pelv showed perinephric fat stranding concerning for bilateral pyelonephritis.  - UCx no growth, BCx no growth.  - Discontinue Zosyn q8H (Day 5/10) and transition to oral Augmentin BID 7/31 for total 10 day course of antibiotics, ending 8/5/2019

## 2019-07-31 NOTE — PROGRESS NOTE ADULT - PROBLEM SELECTOR PLAN 2
- Home regimen Basaglar 12u qhs, humalog 4u before breakfast, 6u before lunch and dinner.  - Patient still not tolerating regular diet well, will encourage and monitor adequate PO fluids and change diet to softs with glucerna supplement.  - 7/30 due to lack of PO fluid intake and nausea added maintenance IVF at 75 mL/hr.  - After relief of constipation patient seems to be tolerating PO much better, 7/31 will consider d/c IVF and transition to PO fluids. - Home regimen Basaglar 12u qhs, humalog 4u before breakfast, 6u before lunch and dinner.  - Patient still not tolerating regular diet well, will encourage and monitor adequate PO fluids and change diet to softs with glucerna supplement.  - 7/30 due to lack of PO fluid intake and nausea added maintenance IVF at 75 mL/hr.  - After relief of constipation patient seems to be tolerating PO much better, 7/31 will d/c IVF and transition to PO fluids. -fasting am fingerstick improved with glargine 23 units last night; continue same dose tonight  -continue lispro 6qac; decrease pre-meal insulin if patient eating less-appetite improving today

## 2019-07-31 NOTE — PROGRESS NOTE ADULT - SUBJECTIVE AND OBJECTIVE BOX
Patient is a 48y old  Female who presents with a chief complaint of DKA, pyelonephritis (2019 15:36)      INTERVAL HPI/OVERNIGHT EVENTS:    T(C): 37.1 (19 @ 21:06), Max: 37.1 (19 @ 21:06)  HR: 80 (19 @ 21:06) (80 - 87)  BP: 146/77 (19 @ 21:06) (134/71 - 146/77)  RR: 16 (19 @ 21:06) (16 - 16)  SpO2: 100% (19 @ 21:06) (100% - 100%)  Wt(kg): --  I&O's Summary      PHYSICAL EXAM:  GENERAL: Lying in bed comfortably in NAD  HEAD:  Atraumatic, Normocephalic  EYES: PERRL, EOMs intact b/l,   ENMT: Moist Mucus membranes  NERVOUS SYSTEM:  A&Ox4, Normal Motor strenght in b/l Upper and lower extremities, gross sensation to light tough intact in b/l upper and lower extremities, CNs II-XII intact b/l w/out focal defcitis, EOMs intact, and PERRL  CHEST/LUNG: CTAB no w/r/r  HEART: RRR no m/g/r  ABDOMEN: +BS, soft, NT, ND  EXTREMITIES:  +2 radial pulses b/l, no LE edema  LYMPH: No anterior/Posterior or supraclavicular LAD  SKIN: No new rashes or DTIs, warm, dry, and intact    PAST MEDICAL & SURGICAL HISTORY:  Diabetes mellitus type I  Cyst of right ovary  Diabetes  Eye injury  History of salpingoophorectomy: Right-sided  History of eye enucleation: right  H/O  section  Prosthetic eye globe   delivery delivered      MEDICATIONS  (STANDING):  docusate sodium 100 milliGRAM(s) Oral two times a day  heparin  Injectable 5000 Unit(s) SubCutaneous every 8 hours  insulin glargine Injectable (LANTUS) 23 Unit(s) SubCutaneous at bedtime  insulin lispro (HumaLOG) corrective regimen sliding scale   SubCutaneous three times a day before meals  insulin lispro (HumaLOG) corrective regimen sliding scale   SubCutaneous at bedtime  insulin lispro Injectable (HumaLOG) 6 Unit(s) SubCutaneous three times a day before meals  lactated ringers. 1000 milliLiter(s) (75 mL/Hr) IV Continuous <Continuous>  piperacillin/tazobactam IVPB.. 3.375 Gram(s) IV Intermittent every 8 hours  senna 2 Tablet(s) Oral daily  senna        MEDICATIONS  (PRN):  acetaminophen   Tablet .. 650 milliGRAM(s) Oral every 6 hours PRN Mild Pain (1 - 3), Moderate Pain (4 - 6)  bisacodyl 5 milliGRAM(s) Oral every 12 hours PRN Constipation  ketorolac   Injectable 15 milliGRAM(s) IV Push every 6 hours PRN Severe Pain (7 - 10)  ondansetron Injectable 4 milliGRAM(s) IV Push every 6 hours PRN Nausea and/or Vomiting      REVIEW OF SYSTEMS:  CONSTITUTIONAL:   EYES:   ENMT:    NECK:   RESPIRATORY:   CARDIOVASCULAR:   GASTROINTESTINAL:   GENITOURINARY:   NEUROLOGICAL:   SKIN:   LYMPH NODES:   ENDOCRINE:   MUSCULOSKELETAL:   PSYCHIATRIC:   HEME/LYMPH:   ALLERY AND IMMUNOLOGIC:     LABS:                        10.0   8.29  )-----------( 425      ( 2019 06:40 )             30.1     07-30    137  |  96<L>  |  5<L>  ----------------------------<  219<H>  3.5   |  29  |  0.82    Ca    9.0      2019 06:40  Phos  2.4       Mg     1.8         TPro  7.0  /  Alb  3.1<L>  /  TBili  0.5  /  DBili  x   /  AST  5   /  ALT  5   /  AlkPhos  81  0730    LIVER FUNCTIONS - ( 2019 06:40 )  Alb: 3.1 g/dL / Pro: 7.0 g/dL / ALK PHOS: 81 u/L / ALT: 5 u/L / AST: 5 u/L / GGT: x     / T. Bili 0.5 mg/dL / D. Bili x             CAPILLARY BLOOD GLUCOSE      POCT Blood Glucose.: 201 mg/dL (2019 21:30)  POCT Blood Glucose.: 246 mg/dL (2019 17:33)  POCT Blood Glucose.: 186 mg/dL (2019 12:05)  POCT Blood Glucose.: 268 mg/dL (2019 08:34)    VBG            RADIOLOGY & ADDITIONAL TESTS:    Xray -   CT -  MRI -     Imaging Personally Reviewed:  [ ] YES  [ ] NO    Consultant(s) Notes Reviewed:  [ ] YES  [ ] NO    Care Discussed with Consultants/Other Providers [ ] YES  [ ] NO Patient is a 48y old  Female who presents with a chief complaint of DKA, pyelonephritis (2019 07:07)      INTERVAL HPI/OVERNIGHT EVENTS:  Patient notes she had enema yesterday and several small bowel movements afterward with considerable improvement of abdominal pain, although abdominal pain is still present primarily in epigastrum.  She continues to ambulate the halls which we encourage.  She has some nausea controlled with Zofran.  She is supplementing diet with Glucerna, tolerating PO intake better today.    T(C): 37.1 (19 @ 21:06), Max: 37.1 (19 @ 21:06)  HR: 80 (19 @ 21:06) (80 - 87)  BP: 146/77 (19 @ 21:06) (134/71 - 146/77)  RR: 16 (19 @ 21:06) (16 - 16)  SpO2: 100% (19 @ 21:06) (100% - 100%)  Wt(kg): --  I&O's Summary      PHYSICAL EXAM:  GENERAL: Lying in bed comfortably in NAD  HEAD:  Atraumatic, Normocephalic  EYES: PERRL, EOMs intact b/l   ENMT: Moist Mucus membranes  NERVOUS SYSTEM:  A&Ox4, Normal Motor strenght in b/l Upper and lower extremities, gross sensation to light tough intact in b/l upper and lower extremities, CNs II-XII intact b/l w/out focal defcitis, EOMs intact, with enucleated pupil on right.  CHEST/LUNG: CTAB no w/r/r  HEART: RRR no m/g/r  ABDOMEN: +BS, soft, nondistended, tenderness diffuse worst in epigastrum  EXTREMITIES:  +2 radial pulses b/l, no LE edema  LYMPH: No anterior/Posterior or supraclavicular LAD  SKIN: No new rashes or DTIs, warm, dry, and intact    PAST MEDICAL & SURGICAL HISTORY:  Diabetes mellitus type I  Cyst of right ovary  Diabetes  Eye injury  History of salpingoophorectomy: Right-sided  History of eye enucleation: right  H/O  section  Prosthetic eye globe   delivery delivered      MEDICATIONS  (STANDING):  docusate sodium 100 milliGRAM(s) Oral two times a day  heparin  Injectable 5000 Unit(s) SubCutaneous every 8 hours  insulin glargine Injectable (LANTUS) 23 Unit(s) SubCutaneous at bedtime  insulin lispro (HumaLOG) corrective regimen sliding scale   SubCutaneous three times a day before meals  insulin lispro (HumaLOG) corrective regimen sliding scale   SubCutaneous at bedtime  insulin lispro Injectable (HumaLOG) 6 Unit(s) SubCutaneous three times a day before meals  lactated ringers. 1000 milliLiter(s) (75 mL/Hr) IV Continuous <Continuous>  piperacillin/tazobactam IVPB.. 3.375 Gram(s) IV Intermittent every 8 hours  senna 2 Tablet(s) Oral daily  senna        MEDICATIONS  (PRN):  acetaminophen   Tablet .. 650 milliGRAM(s) Oral every 6 hours PRN Mild Pain (1 - 3), Moderate Pain (4 - 6)  bisacodyl 5 milliGRAM(s) Oral every 12 hours PRN Constipation  ketorolac   Injectable 15 milliGRAM(s) IV Push every 6 hours PRN Severe Pain (7 - 10)  ondansetron Injectable 4 milliGRAM(s) IV Push every 6 hours PRN Nausea and/or Vomiting      REVIEW OF SYSTEMS:  GENERAL: No fever, no chills, no night sweats, no weight gain or loss  EYES: no change in vision, no blurred vision  HEENT: no trouble swallowing, no trouble speaking, no congestion, no sore throat  NECK: no pain or stiffness  CV: no chest pain or palpitations  RESP: no cough, no shortness of breath  GI: +abdominal pain in epigastrum, +nausea, no vomiting, no diarrhea, no constipation, no BRBPR or melena  : No dysuria, no frequency  NEURO: no headache, no weakness, no dizziness  SKIN: no rashes    LABS:                        10.2   7.23  )-----------( 464      ( 2019 07:20 )             30.8     07-31    138  |  96<L>  |  4<L>  ----------------------------<  135<H>  3.5   |  30  |  0.85    Ca    9.4      2019 07:20  Phos  2.4     07-30  Mg     1.8     07-30    TPro  7.2  /  Alb  3.2<L>  /  TBili  0.4  /  DBili  x   /  AST  6   /  ALT  5   /  AlkPhos  77  07-31    LIVER FUNCTIONS - ( 2019 07:20 )  Alb: 3.2 g/dL / Pro: 7.2 g/dL / ALK PHOS: 77 u/L / ALT: 5 u/L / AST: 6 u/L / GGT: x     / T. Bili 0.4 mg/dL / D. Bili x             CAPILLARY BLOOD GLUCOSE    POCT Blood Glucose.: 162 mg/dL (2019 08:36)  POCT Blood Glucose.: 201 mg/dL (2019 21:30)  POCT Blood Glucose.: 246 mg/dL (2019 17:33)  POCT Blood Glucose.: 186 mg/dL (2019 12:05) Patient is a 48y old  Female who presents with a chief complaint of DKA, pyelonephritis (2019 07:07)      INTERVAL HPI/OVERNIGHT EVENTS:  Patient notes she had enema yesterday and several small bowel movements afterward with considerable improvement of abdominal pain, although abdominal pain is still present primarily in epigastrum.  She continues to ambulate the halls which we encourage.  She has some nausea controlled with Zofran.  She is supplementing diet with Glucerna, tolerating PO intake better today.    T(C): 37.1 (19 @ 21:06), Max: 37.1 (19 @ 21:06)  HR: 80 (19 @ 21:06) (80 - 87)  BP: 146/77 (19 @ 21:06) (134/71 - 146/77)  RR: 16 (19 @ 21:06) (16 - 16)  SpO2: 100% (19 @ 21:06) (100% - 100%)  Wt(kg): --  I&O's Summary      PHYSICAL EXAM:  GENERAL: Lying in bed comfortably in NAD  HEAD:  Atraumatic, Normocephalic  EYES: PERRL, EOMs intact b/l   ENMT: Moist Mucus membranes  NERVOUS SYSTEM:  A&Ox4, Normal Motor strenght in b/l Upper and lower extremities, gross sensation to light tough intact in b/l upper and lower extremities, CNs II-XII intact b/l w/out focal defcitis, EOMs intact, with enucleated pupil on right.  CHEST/LUNG: CTAB no w/r/r  HEART: RRR no m/g/r  ABDOMEN: +BS, soft, nondistended, tenderness diffuse worst in epigastrum  EXTREMITIES:  +2 radial pulses b/l, no LE edema, no calf tenderness  LYMPH: No anterior/Posterior or supraclavicular LAD  SKIN: No new rashes or DTIs, warm, dry, and intact    PAST MEDICAL & SURGICAL HISTORY:  Diabetes mellitus type I  Cyst of right ovary  Diabetes  Eye injury  History of salpingoophorectomy: Right-sided  History of eye enucleation: right  H/O  section  Prosthetic eye globe   delivery delivered      MEDICATIONS  (STANDING):  docusate sodium 100 milliGRAM(s) Oral two times a day  heparin  Injectable 5000 Unit(s) SubCutaneous every 8 hours  insulin glargine Injectable (LANTUS) 23 Unit(s) SubCutaneous at bedtime  insulin lispro (HumaLOG) corrective regimen sliding scale   SubCutaneous three times a day before meals  insulin lispro (HumaLOG) corrective regimen sliding scale   SubCutaneous at bedtime  insulin lispro Injectable (HumaLOG) 6 Unit(s) SubCutaneous three times a day before meals  lactated ringers. 1000 milliLiter(s) (75 mL/Hr) IV Continuous <Continuous>  piperacillin/tazobactam IVPB.. 3.375 Gram(s) IV Intermittent every 8 hours  senna 2 Tablet(s) Oral daily  senna        MEDICATIONS  (PRN):  acetaminophen   Tablet .. 650 milliGRAM(s) Oral every 6 hours PRN Mild Pain (1 - 3), Moderate Pain (4 - 6)  bisacodyl 5 milliGRAM(s) Oral every 12 hours PRN Constipation  ketorolac   Injectable 15 milliGRAM(s) IV Push every 6 hours PRN Severe Pain (7 - 10)  ondansetron Injectable 4 milliGRAM(s) IV Push every 6 hours PRN Nausea and/or Vomiting      REVIEW OF SYSTEMS:  GENERAL: No fever, no chills, no night sweats, no weight gain or loss  EYES: no change in vision, no blurred vision  HEENT: no trouble swallowing, no trouble speaking, no congestion, no sore throat  NECK: no pain or stiffness  CV: no chest pain or palpitations  RESP: no cough, no shortness of breath  GI: +abdominal pain in epigastrum, +nausea, no vomiting, no diarrhea, no constipation, no BRBPR or melena  : No dysuria, no frequency  NEURO: no headache, no weakness, no dizziness  SKIN: no rashes    LABS:                        10.2   7.23  )-----------( 464      ( 2019 07:20 )             30.8     07-31    138  |  96<L>  |  4<L>  ----------------------------<  135<H>  3.5   |  30  |  0.85    Ca    9.4      2019 07:20  Phos  2.4     07-30  Mg     1.8     07-30    TPro  7.2  /  Alb  3.2<L>  /  TBili  0.4  /  DBili  x   /  AST  6   /  ALT  5   /  AlkPhos  77  07-31    LIVER FUNCTIONS - ( 2019 07:20 )  Alb: 3.2 g/dL / Pro: 7.2 g/dL / ALK PHOS: 77 u/L / ALT: 5 u/L / AST: 6 u/L / GGT: x     / T. Bili 0.4 mg/dL / D. Bili x             CAPILLARY BLOOD GLUCOSE    POCT Blood Glucose.: 162 mg/dL (2019 08:36)  POCT Blood Glucose.: 201 mg/dL (2019 21:30)  POCT Blood Glucose.: 246 mg/dL (2019 17:33)  POCT Blood Glucose.: 186 mg/dL (2019 12:05)

## 2019-07-31 NOTE — DISCHARGE NOTE PROVIDER - HOSPITAL COURSE
Eneida June is a 48 year-old female with medical history of T1DM, right eye enucleation, kidney stone, right ovarian cyst s/p salpingo-oophrectomy on 7/22/2019 who presented to our ED on 7/27/2019 with left flank pain radiating to her groin, nausea, vomiting, and chills, and upon testing in the Emergency Room was found to be in diabetic ketoacidosis with a urinalysis concerning for infection.  Fluid resuscitation was initiated and she was transferred to the MICU for further management of her DKA.        In the MICU, the patient was started on an insulin drip and zosyn due to the concern for possible pyelonephritis (fat stranding of kidneys seen on CT abdomen/pelvis).  The patient's fingerstick glucose improved to less than 200, and the patient was transitioned to D5 1/2NS fluids.  Insulin drip was titrated to close the anion gap.  Repeat BMP on 7/28/2019 showed an anion gap of 13 and subsequently 10.  Fingerstick improved to 150s, and patient was given 16U Lantus per endocrinology recommendations.  Patient ate dinner and tolerated PO intake well, was taken off IVF and started on premeal bolus and ISS along with the basal Lantus.  Patient was stable and transferred to the floor.        During her stay on the floor, she required increased doses of Lantus to control her blood sugars, which were titrated up to 23U Lantus on 7/31/2019.  She had abdominal pain that she attributed to constipation that made it difficult for her to tolerate PO intake and supportive IVF were re-initiated.  Her constipation was treated with a bowel regimen of Miralax and senna, and Dulcolax was added when this was ineffective.  A saline enema finally resolved her constipation after which she had several soft bowel movements that resolved her abdominal pain.  She also ambulated well up and down the ahllways of the floor with our encouragement in hopes that this would get her bowels moving.  After resolution of her constipation she had greatly improved tolerance to PO intake and is medically stable for discharge home. Eneida June is a 48 year-old female with medical history of T1DM, right eye enucleation, kidney stone, right ovarian cyst s/p salpingo-oophrectomy on 7/22/2019 who presented to our ED on 7/27/2019 with left flank pain radiating to her groin, nausea, vomiting, and chills, and upon testing in the Emergency Room was found to be in diabetic ketoacidosis with a urinalysis concerning for infection.  Fluid resuscitation was initiated and she was transferred to the MICU for further management of her DKA.        In the MICU, the patient was started on an insulin drip and zosyn due to the concern for possible pyelonephritis (fat stranding of kidneys seen on CT abdomen/pelvis).  The patient's fingerstick glucose improved to less than 200, and the patient was transitioned to D5 1/2NS fluids.  Insulin drip was titrated to close the anion gap.  Repeat BMP on 7/28/2019 showed an anion gap of 13 and subsequently 10.  Fingerstick improved to 150s, and patient was given 16U Lantus per endocrinology recommendations.  Patient ate dinner and tolerated PO intake well, was taken off IVF and started on premeal bolus and ISS along with the basal Lantus.  Patient was stable and transferred to the floor.        During her stay on the floor, she required increased doses of Lantus to control her blood sugars. She had abdominal pain that she attributed to constipation that made it difficult for her to tolerate PO intake and supportive IVF were re-initiated.  Her constipation was treated with a bowel regimen of Miralax and senna, and Dulcolax was added when this was ineffective.  A saline enema finally resolved her constipation after which she had several soft bowel movements that resolved her abdominal pain.  She also ambulated well up and down the ahllways of the floor with our encouragement in hopes that this would get her bowels moving.  After resolution of her constipation she had improvement but then presented again with morning vomiting and epigastric gas pain. Patient was seen by GI, felt this may be gastroparesis and was started on standing Reglan with Zofran as needed, clears and she had good resolution of her symptoms. Diet is being advanced to solids, and patient will be discharged on Basaglar 20 units and Admelog 4 units before meals, with outpatient endocrine follow-up and outpatient GI follow-up for gastric emptying study. Antibiotic therapy for pyelonephritis has been completed. Eneida June is a 48 year-old female with medical history of T1DM, right eye enucleation, kidney stone, right ovarian cyst s/p salpingo-oophrectomy on 7/22/2019 who presented to our ED on 7/27/2019 with left flank pain radiating to her groin, nausea, vomiting, and chills, and upon testing in the Emergency Room was found to be in diabetic ketoacidosis with a urinalysis concerning for infection.  Fluid resuscitation was initiated and she was transferred to the MICU for further management of her DKA.        In the MICU, the patient was started on an insulin drip and zosyn due to the concern for possible pyelonephritis (fat stranding of kidneys seen on CT abdomen/pelvis).  The patient's fingerstick glucose improved to less than 200, and the patient was transitioned to D5 1/2NS fluids.  Insulin drip was titrated to close the anion gap.  Repeat BMP on 7/28/2019 showed an anion gap of 13 and subsequently 10.  Fingerstick improved to 150s, and patient was given 16U Lantus per endocrinology recommendations.  Patient ate dinner and tolerated PO intake well, was taken off IVF and started on premeal bolus and ISS along with the basal Lantus.  Patient was stable and transferred to the floor.        During her stay on the floor, she required increased doses of Lantus to control her blood sugars. She had abdominal pain that she attributed to constipation that made it difficult for her to tolerate PO intake and supportive IVF were re-initiated.  Her constipation was treated with a bowel regimen of Miralax and senna, and Dulcolax was added when this was ineffective.  A saline enema finally resolved her constipation after which she had several soft bowel movements that resolved her abdominal pain.  She also ambulated well up and down the ahllways of the floor with our encouragement in hopes that this would get her bowels moving.  After resolution of her constipation she had improvement but then presented again with morning vomiting and epigastric gas pain. Patient was seen by GI, felt this may be gastroparesis and was started on standing Reglan with Zofran as needed, clears and she had good resolution of her symptoms.  With this regimen her abdominal pain and nausea/vomiting very quickly resolved and she tolerated a clear diet well, but was slightly hypoglycemic to 80-90 before meals on 24U lantus, so this regimen was decreased to 20U Lantus.  Diet advanced to softs and tolerated well, with patient advised to slowly advance diet as tolerated at home.  Patient will be discharged on Basaglar 20 units and Admelog 4 units before meals, with outpatient endocrine follow-up and outpatient GI follow-up for gastric emptying study. Antibiotic therapy for pyelonephritis has been completed. Eneida June is a 48 year-old female with medical history of T1DM, right eye enucleation, kidney stone, right ovarian cyst s/p salpingo-oophrectomy on 7/22/2019 who presented to our ED on 7/27/2019 with left flank pain radiating to her groin, nausea, vomiting, and chills, and upon testing in the Emergency Room was found to be in diabetic ketoacidosis with a urinalysis concerning for infection.  Fluid resuscitation was initiated and she was transferred to the MICU for further management of her DKA.        In the MICU, the patient was started on an insulin drip and zosyn due to the concern for possible pyelonephritis (fat stranding of kidneys seen on CT abdomen/pelvis).  The patient's fingerstick glucose improved to less than 200, and the patient was transitioned to D5 1/2NS fluids.  Insulin drip was titrated to close the anion gap.  Repeat BMP on 7/28/2019 showed an anion gap of 13 and subsequently 10.  Fingerstick improved to 150s, and patient was given 16U Lantus per endocrinology recommendations.  Patient ate dinner and tolerated PO intake well, was taken off IVF and started on premeal bolus and ISS along with the basal Lantus.  Patient was stable and transferred to the floor.        During her stay on the floor, she required increased doses of Lantus to control her blood sugars. She had abdominal pain that she attributed to constipation that made it difficult for her to tolerate PO intake and supportive IVF were re-initiated.  Her constipation was treated with a bowel regimen of Miralax and senna, and Dulcolax was added when this was ineffective.  A saline enema finally resolved her constipation after which she had several soft bowel movements that resolved her abdominal pain.  She also ambulated well up and down the ahllways of the floor with our encouragement in hopes that this would get her bowels moving.  After resolution of her constipation she had improvement but then presented again with morning vomiting and epigastric gas pain. Patient was seen by GI, felt this may be gastroparesis and was started on standing Reglan with Zofran as needed, clears and she had good resolution of her symptoms.  With this regimen her abdominal pain and nausea/vomiting very quickly resolved and she tolerated a clear diet well, but was running 80-90 before meals on 24U lantus, so this regimen was decreased to 20U Lantus.  Diet advanced to softs and tolerated well, with patient advised to slowly advance diet as tolerated at home.  Patient will be discharged on Basaglar 20 units and Admelog 4 units before meals, with outpatient endocrine follow-up and outpatient GI follow-up for gastric emptying study. Antibiotic therapy for pyelonephritis has been completed.

## 2019-08-01 DIAGNOSIS — R10.13 EPIGASTRIC PAIN: ICD-10-CM

## 2019-08-01 LAB
ALBUMIN SERPL ELPH-MCNC: 3.1 G/DL — LOW (ref 3.3–5)
ALP SERPL-CCNC: 74 U/L — SIGNIFICANT CHANGE UP (ref 40–120)
ALT FLD-CCNC: 15 U/L — SIGNIFICANT CHANGE UP (ref 4–33)
ANION GAP SERPL CALC-SCNC: 12 MMO/L — SIGNIFICANT CHANGE UP (ref 7–14)
AST SERPL-CCNC: 17 U/L — SIGNIFICANT CHANGE UP (ref 4–32)
BACTERIA BLD CULT: SIGNIFICANT CHANGE UP
BACTERIA BLD CULT: SIGNIFICANT CHANGE UP
BASOPHILS # BLD AUTO: 0.02 K/UL — SIGNIFICANT CHANGE UP (ref 0–0.2)
BASOPHILS NFR BLD AUTO: 0.3 % — SIGNIFICANT CHANGE UP (ref 0–2)
BILIRUB DIRECT SERPL-MCNC: < 0.2 MG/DL — SIGNIFICANT CHANGE UP (ref 0.1–0.2)
BILIRUB SERPL-MCNC: 0.2 MG/DL — SIGNIFICANT CHANGE UP (ref 0.2–1.2)
BUN SERPL-MCNC: 6 MG/DL — LOW (ref 7–23)
CALCIUM SERPL-MCNC: 9.3 MG/DL — SIGNIFICANT CHANGE UP (ref 8.4–10.5)
CHLORIDE SERPL-SCNC: 98 MMOL/L — SIGNIFICANT CHANGE UP (ref 98–107)
CO2 SERPL-SCNC: 28 MMOL/L — SIGNIFICANT CHANGE UP (ref 22–31)
CREAT SERPL-MCNC: 0.85 MG/DL — SIGNIFICANT CHANGE UP (ref 0.5–1.3)
EOSINOPHIL # BLD AUTO: 0.04 K/UL — SIGNIFICANT CHANGE UP (ref 0–0.5)
EOSINOPHIL NFR BLD AUTO: 0.6 % — SIGNIFICANT CHANGE UP (ref 0–6)
GLUCOSE SERPL-MCNC: 204 MG/DL — HIGH (ref 70–99)
HCT VFR BLD CALC: 31.9 % — LOW (ref 34.5–45)
HGB BLD-MCNC: 10.7 G/DL — LOW (ref 11.5–15.5)
IMM GRANULOCYTES NFR BLD AUTO: 0.6 % — SIGNIFICANT CHANGE UP (ref 0–1.5)
LIDOCAIN IGE QN: 10.5 U/L — SIGNIFICANT CHANGE UP (ref 7–60)
LYMPHOCYTES # BLD AUTO: 1.68 K/UL — SIGNIFICANT CHANGE UP (ref 1–3.3)
LYMPHOCYTES # BLD AUTO: 23.6 % — SIGNIFICANT CHANGE UP (ref 13–44)
MAGNESIUM SERPL-MCNC: 2 MG/DL — SIGNIFICANT CHANGE UP (ref 1.6–2.6)
MCHC RBC-ENTMCNC: 29.2 PG — SIGNIFICANT CHANGE UP (ref 27–34)
MCHC RBC-ENTMCNC: 33.5 % — SIGNIFICANT CHANGE UP (ref 32–36)
MCV RBC AUTO: 86.9 FL — SIGNIFICANT CHANGE UP (ref 80–100)
MONOCYTES # BLD AUTO: 0.64 K/UL — SIGNIFICANT CHANGE UP (ref 0–0.9)
MONOCYTES NFR BLD AUTO: 9 % — SIGNIFICANT CHANGE UP (ref 2–14)
NEUTROPHILS # BLD AUTO: 4.71 K/UL — SIGNIFICANT CHANGE UP (ref 1.8–7.4)
NEUTROPHILS NFR BLD AUTO: 65.9 % — SIGNIFICANT CHANGE UP (ref 43–77)
NRBC # FLD: 0 K/UL — SIGNIFICANT CHANGE UP (ref 0–0)
PHOSPHATE SERPL-MCNC: 3.6 MG/DL — SIGNIFICANT CHANGE UP (ref 2.5–4.5)
PLATELET # BLD AUTO: 447 K/UL — HIGH (ref 150–400)
PMV BLD: 10.9 FL — SIGNIFICANT CHANGE UP (ref 7–13)
POTASSIUM SERPL-MCNC: 3.8 MMOL/L — SIGNIFICANT CHANGE UP (ref 3.5–5.3)
POTASSIUM SERPL-SCNC: 3.8 MMOL/L — SIGNIFICANT CHANGE UP (ref 3.5–5.3)
PROT SERPL-MCNC: 7.1 G/DL — SIGNIFICANT CHANGE UP (ref 6–8.3)
RBC # BLD: 3.67 M/UL — LOW (ref 3.8–5.2)
RBC # FLD: 12.1 % — SIGNIFICANT CHANGE UP (ref 10.3–14.5)
SODIUM SERPL-SCNC: 138 MMOL/L — SIGNIFICANT CHANGE UP (ref 135–145)
WBC # BLD: 7.13 K/UL — SIGNIFICANT CHANGE UP (ref 3.8–10.5)
WBC # FLD AUTO: 7.13 K/UL — SIGNIFICANT CHANGE UP (ref 3.8–10.5)

## 2019-08-01 PROCEDURE — 99232 SBSQ HOSP IP/OBS MODERATE 35: CPT

## 2019-08-01 PROCEDURE — 99222 1ST HOSP IP/OBS MODERATE 55: CPT | Mod: GC

## 2019-08-01 PROCEDURE — 99233 SBSQ HOSP IP/OBS HIGH 50: CPT

## 2019-08-01 PROCEDURE — 93010 ELECTROCARDIOGRAM REPORT: CPT

## 2019-08-01 PROCEDURE — 76705 ECHO EXAM OF ABDOMEN: CPT | Mod: 26

## 2019-08-01 RX ORDER — CEFTRIAXONE 500 MG/1
1000 INJECTION, POWDER, FOR SOLUTION INTRAMUSCULAR; INTRAVENOUS EVERY 24 HOURS
Refills: 0 | Status: DISCONTINUED | OUTPATIENT
Start: 2019-08-02 | End: 2019-08-02

## 2019-08-01 RX ORDER — ACETAMINOPHEN 500 MG
1000 TABLET ORAL ONCE
Refills: 0 | Status: DISCONTINUED | OUTPATIENT
Start: 2019-08-01 | End: 2019-08-03

## 2019-08-01 RX ORDER — CEFTRIAXONE 500 MG/1
1000 INJECTION, POWDER, FOR SOLUTION INTRAMUSCULAR; INTRAVENOUS EVERY 24 HOURS
Refills: 0 | Status: DISCONTINUED | OUTPATIENT
Start: 2019-08-01 | End: 2019-08-01

## 2019-08-01 RX ORDER — METOCLOPRAMIDE HCL 10 MG
10 TABLET ORAL EVERY 6 HOURS
Refills: 0 | Status: DISCONTINUED | OUTPATIENT
Start: 2019-08-01 | End: 2019-08-03

## 2019-08-01 RX ORDER — SIMETHICONE 80 MG/1
80 TABLET, CHEWABLE ORAL EVERY 12 HOURS
Refills: 0 | Status: DISCONTINUED | OUTPATIENT
Start: 2019-08-01 | End: 2019-08-03

## 2019-08-01 RX ORDER — POLYETHYLENE GLYCOL 3350 17 G/17G
17 POWDER, FOR SOLUTION ORAL DAILY
Refills: 0 | Status: DISCONTINUED | OUTPATIENT
Start: 2019-08-01 | End: 2019-08-03

## 2019-08-01 RX ORDER — METOCLOPRAMIDE HCL 10 MG
10 TABLET ORAL ONCE
Refills: 0 | Status: COMPLETED | OUTPATIENT
Start: 2019-08-01 | End: 2019-08-01

## 2019-08-01 RX ORDER — METOCLOPRAMIDE HCL 10 MG
10 TABLET ORAL EVERY 6 HOURS
Refills: 0 | Status: DISCONTINUED | OUTPATIENT
Start: 2019-08-01 | End: 2019-08-01

## 2019-08-01 RX ORDER — INSULIN GLARGINE 100 [IU]/ML
24 INJECTION, SOLUTION SUBCUTANEOUS AT BEDTIME
Refills: 0 | Status: DISCONTINUED | OUTPATIENT
Start: 2019-08-01 | End: 2019-08-02

## 2019-08-01 RX ORDER — ACETAMINOPHEN 500 MG
1000 TABLET ORAL ONCE
Refills: 0 | Status: DISCONTINUED | OUTPATIENT
Start: 2019-08-01 | End: 2019-08-01

## 2019-08-01 RX ADMIN — POLYETHYLENE GLYCOL 3350 17 GRAM(S): 17 POWDER, FOR SOLUTION ORAL at 17:49

## 2019-08-01 RX ADMIN — HEPARIN SODIUM 5000 UNIT(S): 5000 INJECTION INTRAVENOUS; SUBCUTANEOUS at 13:02

## 2019-08-01 RX ADMIN — SIMETHICONE 80 MILLIGRAM(S): 80 TABLET, CHEWABLE ORAL at 19:47

## 2019-08-01 RX ADMIN — Medication 1: at 13:01

## 2019-08-01 RX ADMIN — Medication 5 MILLIGRAM(S): at 17:47

## 2019-08-01 RX ADMIN — SENNA PLUS 2 TABLET(S): 8.6 TABLET ORAL at 13:02

## 2019-08-01 RX ADMIN — INSULIN GLARGINE 24 UNIT(S): 100 INJECTION, SOLUTION SUBCUTANEOUS at 22:36

## 2019-08-01 RX ADMIN — Medication 10 MILLIGRAM(S): at 23:22

## 2019-08-01 RX ADMIN — Medication 100 MILLIGRAM(S): at 17:49

## 2019-08-01 RX ADMIN — Medication 650 MILLIGRAM(S): at 03:28

## 2019-08-01 RX ADMIN — HEPARIN SODIUM 5000 UNIT(S): 5000 INJECTION INTRAVENOUS; SUBCUTANEOUS at 06:22

## 2019-08-01 RX ADMIN — Medication 10 MILLIGRAM(S): at 17:49

## 2019-08-01 RX ADMIN — SIMETHICONE 80 MILLIGRAM(S): 80 TABLET, CHEWABLE ORAL at 09:52

## 2019-08-01 RX ADMIN — Medication 6 UNIT(S): at 09:52

## 2019-08-01 RX ADMIN — CEFTRIAXONE 100 MILLIGRAM(S): 500 INJECTION, POWDER, FOR SOLUTION INTRAMUSCULAR; INTRAVENOUS at 09:52

## 2019-08-01 RX ADMIN — Medication 6 UNIT(S): at 17:46

## 2019-08-01 RX ADMIN — ONDANSETRON 4 MILLIGRAM(S): 8 TABLET, FILM COATED ORAL at 07:12

## 2019-08-01 RX ADMIN — Medication 1: at 17:46

## 2019-08-01 RX ADMIN — Medication 1 TABLET(S): at 06:22

## 2019-08-01 RX ADMIN — Medication 6 UNIT(S): at 13:01

## 2019-08-01 RX ADMIN — Medication 650 MILLIGRAM(S): at 02:58

## 2019-08-01 RX ADMIN — Medication 1: at 09:52

## 2019-08-01 RX ADMIN — Medication 10 MILLIGRAM(S): at 09:52

## 2019-08-01 RX ADMIN — Medication 100 MILLIGRAM(S): at 06:22

## 2019-08-01 NOTE — PROGRESS NOTE ADULT - PROBLEM SELECTOR PLAN 2
-fasting am fingerstick improved with glargine 23 units last night; continue same dose tonight  -continue lispro 6qac; decrease pre-meal insulin if patient eating less-appetite improving today -Resolved -fasting am fingerstick improved, increase glargine to 24u.  -continue lispro 6qac; decrease pre-meal insulin if patient eating less-appetite improving today

## 2019-08-01 NOTE — PROGRESS NOTE ADULT - PROBLEM SELECTOR PLAN 4
Last bowel mvmt thursday    - Gave saline enema and began duclolax BID regimen, encouraged OOB, patient experienced relief and continued to have soft stools with this regimen, will continue dulcolax. - Gave saline enema 7/31 and began dulcolax BID regimen, encouraged OOB, patient experienced relief and continued to have soft stools with this regimen, will continue scheduled dulcolax. - L flank pain, WBC 19 and fever 100.5 on admission.  CT abd/pelv showed perinephric fat stranding concerning for bilateral pyelonephritis.  - UCx no growth, BCx no growth.  - Discontinue Zosyn q8H (Day 5/10) and transition to oral Augmentin BID 7/31 for total 10 day course of antibiotics, ending 8/5/2019.  - Was transitioned from Zosyn q8H 7/31 to oral Augmentin, but due to repeated vomiting today have restarted IV antibiotics with ceftriaxone (Day 6/7); will give total 7 days. - L flank pain, WBC 19 and fever 100.5 on admission.  CT abd/pelv showed perinephric fat stranding concerning for bilateral pyelonephritis.  - UCx no growth, BCx no growth.  - Discontinue Zosyn q8H (Day 5/10) and transition to oral Augmentin BID 7/31 for total 10 day course of antibiotics, ending 8/5/2019.  - Was transitioned from Zosyn q8H 7/31 to oral Augmentin, but due to repeated vomiting today have restarted IV antibiotics with ceftriaxone (Day 6/7); will give total 7 days antibiotics. - Continue dulcolax BID

## 2019-08-01 NOTE — CONSULT NOTE ADULT - ATTENDING COMMENTS
Pt seen and examined  Please see F/U note.    AMELIA Rubi
I have personally interviewed and examined this patient, reviewed pertinent labs and imaging, and discussed the case with colleagues, residents, physician assistants, and nurses on SICU rounds.    30     minutes in total were spent in providing direct critical care for the diagnoses, assessment and plan outlined below.  These diagnoses are unrelated to the surgical procedure.  Additionally, time spent in the performance of separately billable procedures was not counted toward the critical care time.  There is no overlap.         Imaging and exam suggestive of pyelonephritis.  DKA management  abx  does not appear at this time to have an acute surgical issue      The Acute Care Surgery (B Team) Attending Group Practice:  Dr. Jessy Romeo, Dr. Adrian Amaral, Dr. Jennifer Brown, Dr. Maco Mariee    urgent issues - spectra [id number] or [id number]  nonurgent issues - [phone]  patient appointments or afterhours - [phone]
I have seen and evaluated the patient with the GI Fellow and GI Team.  I agree with the findings, formulation and plan of care as documented in the resident / fellows note and edited where appropriate.

## 2019-08-01 NOTE — PROGRESS NOTE ADULT - PROBLEM SELECTOR PLAN 5
- Patient had 2.5g drop in Hgb, no active source of bleed.  May be dilutional.   - Labwork for hemolysis obtained, no sign of hemolysis, iron studies consistent with anemia of chronic inflammatory disease. - Gave saline enema 7/31 and began dulcolax BID regimen, encouraged OOB, patient experienced relief and continued to have soft stools with this regimen, will continue scheduled dulcolax.

## 2019-08-01 NOTE — PROGRESS NOTE ADULT - ASSESSMENT
Patient is a 47 yo woman with type 1 diabetes admitted for DKA found to have pyelonephritis       T1 DM, uncontrolled  - target bg 100-180 mg/dl  - slightly above goal on fasting serum glucose   - increase lantus to 24 units sq qhs  - c/w humalog to 6 units sq tid ac  - c/w humalog low correction scale ac and HS    DC planning- pt can follow up with her private endocrinologist.  Would be discharged on b/b- doses TBD. See full consult for complete plan of care.

## 2019-08-01 NOTE — CONSULT NOTE ADULT - SUBJECTIVE AND OBJECTIVE BOX
Chief Complaint:      HPI: 49 yo F with Pmhx of DMI, R eye enucleation, kidney stone and R ovarian cyst s/p salpingooophrectomy on 2019  presented with nausea/vomiting and RUQ pain, found to have DKA and bl pyelonephritis. She was intially managed in the MICU with insulin gtt and zosyn. Her DKA resolved and she was transferred to the floor for further management. However, her RUQ pain remained unchanged. The pain is sharp, radiating down to her lower abdomen, 10/10 in severity, and intermittent. It lasts for hours and mostly occurs at night when she lies down. It is also associated with nausea and vomiting and as a result, she is unable to tolerate any PO intake. It improves with oxycodone and defecation. She denies any chronic NSAIDs use. She had EGD in the past but no colonoscopy. She denies any recent sick contacts or recent travel. She underwent CT scan A/P which showed no abdominal pathology. US of the gall bladder showed no cholecystitis. She was managed with pain medication and laxatives. She had multiple bowel movements on 2019 with mild improvement in her pain. Otherwise, denies any fever, chills, chest pain, SOB, orthopnea, diarrhea, hematochezia, melena, dysuria, hematuria, or LE edema.       Allergies:  No Known Allergies      Home Medications:    Hospital Medications:  acetaminophen   Tablet .. 650 milliGRAM(s) Oral every 6 hours PRN  bisacodyl 5 milliGRAM(s) Oral every 12 hours  docusate sodium 100 milliGRAM(s) Oral two times a day  heparin  Injectable 5000 Unit(s) SubCutaneous every 8 hours  insulin glargine Injectable (LANTUS) 24 Unit(s) SubCutaneous at bedtime  insulin lispro (HumaLOG) corrective regimen sliding scale   SubCutaneous three times a day before meals  insulin lispro (HumaLOG) corrective regimen sliding scale   SubCutaneous at bedtime  insulin lispro Injectable (HumaLOG) 6 Unit(s) SubCutaneous three times a day before meals  ketorolac   Injectable 15 milliGRAM(s) IV Push every 6 hours PRN  metoclopramide Injectable 10 milliGRAM(s) IV Push every 6 hours  ondansetron Injectable 4 milliGRAM(s) IV Push every 6 hours PRN  senna 2 Tablet(s) Oral daily  senna      simethicone 80 milliGRAM(s) Chew every 12 hours      PMHX/PSHX:  Diabetes mellitus type I  Cyst of right ovary  Diabetes  Eye injury  History of salpingoophorectomy  History of eye enucleation  H/O  section  Prosthetic eye globe   delivery delivered      Family history:  No pertinent family history in first degree relatives  No pertinent family history in first degree relatives      Social History: no smoking    ROS:   General:  No fevers, chills or night sweats.  ENT:  No sore throat or dysphagia  CV:  No pain or palpitations  Resp:  No dyspnea, cough, wheezing  GI:  as above  Skin:  No rash or edema      PHYSICAL EXAM:   GENERAL:  NAD, Appears stated age  HEENT:  NC/AT,  conjunctivae clear and pink, sclera -anicteric  CHEST:  CTA B/L, Normal effort  HEART:  RRR S1/S2, No murmurs  ABDOMEN:  Soft, non-tender, non-distended, normoactive bowel sounds,  no masses   EXTEREMITIES:  No cyanosis or Edema  SKIN:  Warm & Dry. No rash or erythema  NEURO:  Alert, oriented    Vital Signs:  Vital Signs Last 24 Hrs  T(C): 36.5 (01 Aug 2019 05:37), Max: 36.7 (01 Aug 2019 03:01)  T(F): 97.7 (01 Aug 2019 05:37), Max: 98.1 (01 Aug 2019 03:01)  HR: 77 (01 Aug 2019 05:37) (73 - 77)  BP: 139/75 (01 Aug 2019 05:37) (137/70 - 139/75)  BP(mean): --  RR: 18 (01 Aug 2019 05:37) (17 - 18)  SpO2: 98% (01 Aug 2019 05:37) (97% - 99%)  Daily     Daily     LABS:                        10.7   7.13  )-----------( 447      ( 01 Aug 2019 06:40 )             31.9     Mean Cell Volume: 86.9 fL (19 @ 06:40)        138  |  98  |  6<L>  ----------------------------<  204<H>  3.8   |  28  |  0.85    Ca    9.3      01 Aug 2019 06:40  Phos  3.6       Mg     2.0     08    TPro  7.2  /  Alb  3.2<L>  /  TBili  0.4  /  DBili  x   /  AST  6   /  ALT  5   /  AlkPhos  77      LIVER FUNCTIONS - ( 2019 07:20 )  Alb: 3.2 g/dL / Pro: 7.2 g/dL / ALK PHOS: 77 u/L / ALT: 5 u/L / AST: 6 u/L / GGT: x                                       10.7   7.13  )-----------( 447      ( 01 Aug 2019 06:40 )             31.9                         10.2   7.23  )-----------( 464      ( 2019 07:20 )             30.8                         10.0   8.29  )-----------( 425      ( 2019 06:40 )             30.1     Imaging: Chief Complaint:      HPI: 49 yo F with Pmhx of DMI, R eye enucleation, kidney stone and R ovarian cyst s/p salpingooophrectomy on 2019 presented with nausea/vomiting and RUQ pain, found to have DKA and bl pyelonephritis. She was intially managed in the MICU with insulin gtt and zosyn. Her DKA resolved and she was transferred to the floor for further management. However, her RUQ pain remained unchanged. The pain is sharp, radiating down to her lower abdomen, 10/10 in severity, and intermittent. It lasts for hours and mostly occurs at night when she lies down. It is also associated with nausea and vomiting and as a result, she is unable to tolerate any PO intake. It improves with oxycodone and defecation. She denies any chronic NSAIDs use. She had EGD in the past but no colonoscopy. She denies any recent sick contacts or recent travel. She underwent CT scan A/P which showed no abdominal pathology. US of the gall bladder showed no cholecystitis. She was managed with pain medication and laxatives. She had multiple bowel movements on 2019 with mild improvement in her pain. Otherwise, denies any fever, chills, chest pain, SOB, orthopnea, diarrhea, hematochezia, melena, dysuria, hematuria, or LE edema.       Allergies:  No Known Allergies      Home Medications:    Hospital Medications:  acetaminophen   Tablet .. 650 milliGRAM(s) Oral every 6 hours PRN  bisacodyl 5 milliGRAM(s) Oral every 12 hours  docusate sodium 100 milliGRAM(s) Oral two times a day  heparin  Injectable 5000 Unit(s) SubCutaneous every 8 hours  insulin glargine Injectable (LANTUS) 24 Unit(s) SubCutaneous at bedtime  insulin lispro (HumaLOG) corrective regimen sliding scale   SubCutaneous three times a day before meals  insulin lispro (HumaLOG) corrective regimen sliding scale   SubCutaneous at bedtime  insulin lispro Injectable (HumaLOG) 6 Unit(s) SubCutaneous three times a day before meals  ketorolac   Injectable 15 milliGRAM(s) IV Push every 6 hours PRN  metoclopramide Injectable 10 milliGRAM(s) IV Push every 6 hours  ondansetron Injectable 4 milliGRAM(s) IV Push every 6 hours PRN  senna 2 Tablet(s) Oral daily  senna      simethicone 80 milliGRAM(s) Chew every 12 hours      PMHX/PSHX:  Diabetes mellitus type I  Cyst of right ovary  Diabetes  Eye injury  History of salpingoophorectomy  History of eye enucleation  H/O  section  Prosthetic eye globe   delivery delivered      Family history:  No pertinent family history in first degree relatives  No pertinent family history in first degree relatives      Social History: no smoking    ROS:   General:  No fevers, chills or night sweats.  ENT:  No sore throat or dysphagia  CV:  No pain or palpitations  Resp:  No dyspnea, cough, wheezing  GI:  as above  Skin:  No rash or edema      PHYSICAL EXAM:   GENERAL:  NAD, Appears stated age  HEENT:  NC/AT,  conjunctivae clear and pink, sclera -anicteric  CHEST:  CTA B/L, Normal effort  HEART:  RRR S1/S2, No murmurs  ABDOMEN:  Soft, non-tender, non-distended, normoactive bowel sounds,  no masses   EXTEREMITIES:  No cyanosis or Edema  SKIN:  Warm & Dry. No rash or erythema  NEURO:  Alert, oriented    Vital Signs:  Vital Signs Last 24 Hrs  T(C): 36.5 (01 Aug 2019 05:37), Max: 36.7 (01 Aug 2019 03:01)  T(F): 97.7 (01 Aug 2019 05:37), Max: 98.1 (01 Aug 2019 03:01)  HR: 77 (01 Aug 2019 05:37) (73 - 77)  BP: 139/75 (01 Aug 2019 05:37) (137/70 - 139/75)  BP(mean): --  RR: 18 (01 Aug 2019 05:37) (17 - 18)  SpO2: 98% (01 Aug 2019 05:37) (97% - 99%)  Daily     Daily     LABS:                        10.7   7.13  )-----------( 447      ( 01 Aug 2019 06:40 )             31.9     Mean Cell Volume: 86.9 fL (19 @ 06:40)        138  |  98  |  6<L>  ----------------------------<  204<H>  3.8   |  28  |  0.85    Ca    9.3      01 Aug 2019 06:40  Phos  3.6       Mg     2.0     08    TPro  7.2  /  Alb  3.2<L>  /  TBili  0.4  /  DBili  x   /  AST  6   /  ALT  5   /  AlkPhos  77      LIVER FUNCTIONS - ( 2019 07:20 )  Alb: 3.2 g/dL / Pro: 7.2 g/dL / ALK PHOS: 77 u/L / ALT: 5 u/L / AST: 6 u/L / GGT: x                                       10.7   7.13  )-----------( 447      ( 01 Aug 2019 06:40 )             31.9                         10.2   7.23  )-----------( 464      ( 2019 07:20 )             30.8                         10.0   8.29  )-----------( 425      ( 2019 06:40 )             30.1     Imaging: Chief Complaint:      HPI: 47 yo F with Pmhx of DMI, R eye enucleation, kidney stone and R ovarian cyst s/p salpingooophrectomy on 2019 presented with nausea/vomiting and RUQ pain, found to have DKA and bl pyelonephritis. She was intially managed in the MICU with insulin gtt and zosyn. Her DKA resolved and she was transferred to the floor for further management. However, her RUQ pain remained unchanged. The pain is sharp, radiating down to her lower abdomen, 10/10 in severity, and intermittent. It lasts for hours and mostly occurs at night when she lies down. It is also associated with nausea and vomiting and as a result, she is unable to tolerate any PO intake. It improves with oxycodone and defecation. She denies any chronic NSAIDs use. She had EGD in the past but no colonoscopy. She denies any recent sick contacts or recent travel. She underwent CT scan A/P which showed no abdominal pathology. US of the gall bladder showed no cholecystitis. She was managed with pain medication and laxatives. She had multiple bowel movements on 2019 with mild improvement in her pain. Otherwise, denies any fever, chills, chest pain, SOB, orthopnea, diarrhea, hematochezia, melena, dysuria, hematuria, or LE edema.       Allergies:  No Known Allergies      Home Medications:    Hospital Medications:  acetaminophen   Tablet .. 650 milliGRAM(s) Oral every 6 hours PRN  bisacodyl 5 milliGRAM(s) Oral every 12 hours  docusate sodium 100 milliGRAM(s) Oral two times a day  heparin  Injectable 5000 Unit(s) SubCutaneous every 8 hours  insulin glargine Injectable (LANTUS) 24 Unit(s) SubCutaneous at bedtime  insulin lispro (HumaLOG) corrective regimen sliding scale   SubCutaneous three times a day before meals  insulin lispro (HumaLOG) corrective regimen sliding scale   SubCutaneous at bedtime  insulin lispro Injectable (HumaLOG) 6 Unit(s) SubCutaneous three times a day before meals  ketorolac   Injectable 15 milliGRAM(s) IV Push every 6 hours PRN  metoclopramide Injectable 10 milliGRAM(s) IV Push every 6 hours  ondansetron Injectable 4 milliGRAM(s) IV Push every 6 hours PRN  senna 2 Tablet(s) Oral daily  senna      simethicone 80 milliGRAM(s) Chew every 12 hours      PMHX/PSHX:  Diabetes mellitus type I  Cyst of right ovary  Diabetes  Eye injury  History of salpingoophorectomy  History of eye enucleation  H/O  section  Prosthetic eye globe   delivery delivered      Family history:  No pertinent family history in first degree relatives  No pertinent family history in first degree relatives      Social History: no smoking, drugs or EtOH use    ROS:   Pertinent ROS per HPI, otherwise unremarkable  General:  No fevers, chills or night sweats.  ENT:  No sore throat or dysphagia  CV:  No pain or palpitations  Resp:  No dyspnea, cough, wheezing  GI:  as above  Skin:  No rash or edema    Vital Signs:  Vital Signs Last 24 Hrs  T(C): 36.5 (01 Aug 2019 05:37), Max: 36.7 (01 Aug 2019 03:01)  T(F): 97.7 (01 Aug 2019 05:37), Max: 98.1 (01 Aug 2019 03:01)  HR: 77 (01 Aug 2019 05:37) (73 - 77)  BP: 139/75 (01 Aug 2019 05:37) (137/70 - 139/75)  BP(mean): --  RR: 18 (01 Aug 2019 05:37) (17 - 18)  SpO2: 98% (01 Aug 2019 05:37) (97% - 99%)    PHYSICAL EXAM:   GENERAL:  NAD, Appears stated age  HEENT:  NC/AT,  conjunctivae clear and pink, sclera -anicteric  NECK: supple  CHEST:  CTA B/L, Normal effort  HEART:  RRR S1/S2, No murmurs  ABDOMEN:  Soft, non-tender, non-distended, normoactive bowel sounds,  no masses   EXTREMITIES:  No cyanosis or Edema  SKIN:  Warm & Dry. No rash or erythema  NEURO:  Alert, oriented  PSYCH: normal affect               10.7   7.13  )-----------( 447      ( 01 Aug 2019 06:40 )             31.9     Mean Cell Volume: 86.9 fL (- @ 06:40)        138  |  98  |  6<L>  ----------------------------<  204<H>  3.8   |  28  |  0.85    Ca    9.3      01 Aug 2019 06:40  Phos  3.6     08-  Mg     2.0     08-    TPro  7.2  /  Alb  3.2<L>  /  TBili  0.4  /  DBili  x   /  AST  6   /  ALT  5   /  AlkPhos  77      LIVER FUNCTIONS - ( 2019 07:20 )  Alb: 3.2 g/dL / Pro: 7.2 g/dL / ALK PHOS: 77 u/L / ALT: 5 u/L / AST: 6 u/L / GGT: x                                       10.7   7.13  )-----------( 447      ( 01 Aug 2019 06:40 )             31.9                         10.2   7.23  )-----------( 464      ( 2019 07:20 )             30.8                         10.0   8.29  )-----------( 425      ( 2019 06:40 )             30.1     Imaging:

## 2019-08-01 NOTE — PROGRESS NOTE ADULT - PROBLEM SELECTOR PLAN 6
Patient had recent right salpingo-oophrectomy     - CT abd/pelv and CT urogram do not demonstrate abscess or intraabdominal process, no ureteral injury. - Patient had 2.5g drop in Hgb, no active source of bleed.  May be dilutional.   - Labwork for hemolysis obtained, no sign of hemolysis, iron studies consistent with anemia of chronic inflammatory disease.

## 2019-08-01 NOTE — CHART NOTE - NSCHARTNOTEFT_GEN_A_CORE
Pt seen at bedside following call from primary medicine. Per primary team, pt endorsing epigastric and RUQ pain. Upon interview, patient states that she has had upper abdominal pain since her admission. Pain worsens at night. Pt states pain is different than pain she experienced prior to surgery. Pt states lower abdomen is nontender and without pain and has no pelvic complaints. Pt states trochar incisions have no pain/discharge and that she has not experienced any vaginal bleeding. Pt endorsing no gyn complaints at this time.    Vital Signs Last 24 Hours  T(C): 36.8 (08-01-19 @ 14:00), Max: 36.8 (08-01-19 @ 14:00)  HR: 79 (08-01-19 @ 14:00) (73 - 79)  BP: 127/72 (08-01-19 @ 14:00) (127/72 - 139/75)  RR: 18 (08-01-19 @ 14:00) (17 - 18)  SpO2: 100% (08-01-19 @ 14:00) (97% - 100%)    I&O's Summary      Physical Exam:  General: NAD  CV: RR  Lungs: breathing comfortably on RA  Abdomen: soft, nontender in lower abdomen, non-distended  Incision: trochar sites CDI, no erythema, no drainage  Ext: no pain or swelling    Labs:             10.7<L>  7.13  )-----------( 447<H>    ( 08-01 @ 06:40 )             31.9<L>               10.2<L>  7.23  )-----------( 464<H>    ( 07-31 @ 07:20 )             30.8<L>               10.0<L>  8.29  )-----------( 425<H>    ( 07-30 @ 06:40 )             30.1<L>               10.3<L>  13.15<H> )-----------( 382      ( 07-29 @ 02:27 )             30.9<L>               11.3<L>  17.63<H> )-----------( 382      ( 07-28 @ 02:20 )             34.5         MEDICATIONS  (STANDING):  bisacodyl 5 milliGRAM(s) Oral every 12 hours  docusate sodium 100 milliGRAM(s) Oral two times a day  heparin  Injectable 5000 Unit(s) SubCutaneous every 8 hours  insulin glargine Injectable (LANTUS) 24 Unit(s) SubCutaneous at bedtime  insulin lispro (HumaLOG) corrective regimen sliding scale   SubCutaneous three times a day before meals  insulin lispro (HumaLOG) corrective regimen sliding scale   SubCutaneous at bedtime  insulin lispro Injectable (HumaLOG) 6 Unit(s) SubCutaneous three times a day before meals  metoclopramide Injectable 10 milliGRAM(s) IV Push every 6 hours  senna 2 Tablet(s) Oral daily  senna      simethicone 80 milliGRAM(s) Chew every 12 hours    MEDICATIONS  (PRN):  acetaminophen   Tablet .. 650 milliGRAM(s) Oral every 6 hours PRN Mild Pain (1 - 3), Moderate Pain (4 - 6)  acetaminophen  IVPB .. 1000 milliGRAM(s) IV Intermittent once PRN Severe Pain (7 - 10)  ketorolac   Injectable 15 milliGRAM(s) IV Push every 6 hours PRN Severe Pain (7 - 10)  ondansetron Injectable 4 milliGRAM(s) IV Push every 6 hours PRN Nausea and/or Vomiting    48yoF h/o T1DM, R eye enucleation, kidney stone, R ovarian cyst s/p salpingooophrectomy on 7/22/2019 admitted on 7/27 for L flank pain radiating to groin, N/V, and fever/chills, found to be in DKA in the ER with a positive UA.  Transferred from the MICU 7/28 after improvement and closure of AG.    - no gyn concerns at present  - consider repeat abdominal imaging if patient's upper abdominal pain persists  - pain unlikely to be due to gyn or post-op source  - pt denying any gyn complaints  - all questions answered    D/w attending Dr. Bruce Raymond MD PGY3

## 2019-08-01 NOTE — PROGRESS NOTE ADULT - SUBJECTIVE AND OBJECTIVE BOX
Overnight: Patient had difficulty resting and was nauseous overnight with gas, and vomiting multiple times this morning with food contents in the basin when examined.  She once again denies that this is a chronic problem that she has had before.  Now localized to RUQ.  Gave reglan and ordered urgent RUQ u/s.  Reordered simethicone and verified with nursing she would get her q12h dulcolax which they were not giving because she was not endorsing constipation. Patient is a 48y old  Female who presents with a chief complaint of DKA, pyelonephritis (01 Aug 2019 07:49)      INTERVAL HPI/OVERNIGHT EVENTS:  Patient had difficulty resting and increased abdominal pain despite continued ambulation last night, and was nauseous overnight with burping and vomiting multiple times this morning with food contents in the basin when examined.  She once again denies that this is a chronic problem that she has never had before.  Now localized to RUQ.  Gave reglan and ordered urgent RUQ u/s.  Reordered simethicone and verified with nursing she would get her q12h dulcolax which they were not giving because she was not endorsing constipation.    T(C): 36.5 (19 @ 05:37), Max: 37.1 (19 @ 13:15)  HR: 77 (19 @ 05:37) (73 - 84)  BP: 139/75 (19 @ 05:37) (132/75 - 139/75)  RR: 18 (19 @ 05:37) (17 - 18)  SpO2: 98% (19 @ 05:37) (97% - 100%)  Wt(kg): --  I&O's Summary      PHYSICAL EXAM:  GENERAL: Sitting in bed with basin vomiting, clear vomitus with partially digested food present in basin  HEAD:  Atraumatic, Normocephalic  EYES: Enucleated R pupil, EOMs intact b/l   ENMT: Moist Mucus membranes  NERVOUS SYSTEM:  A&Ox4, Normal Motor strenght in b/l Upper and lower extremities, gross sensation to light tough intact in b/l upper and lower extremities, CNs II-XII intact b/l w/out focal deficits.  CHEST/LUNG: CTAB no w/r/r  HEART: RRR no m/g/r  ABDOMEN: +BS, soft, nondistended, tenderness in epigastrum and RUQ  EXTREMITIES:  +2 radial pulses b/l, no LE edema, no calf tenderness  LYMPH: No anterior/Posterior or supraclavicular LAD  SKIN: No new rashes or DTIs, warm, dry, and intact    PAST MEDICAL & SURGICAL HISTORY:  Diabetes mellitus type I  Cyst of right ovary  Diabetes  Eye injury  History of salpingoophorectomy: Right-sided  History of eye enucleation: right  H/O  section  Prosthetic eye globe   delivery delivered      MEDICATIONS  (STANDING):  bisacodyl 5 milliGRAM(s) Oral every 12 hours  cefTRIAXone   IVPB 1000 milliGRAM(s) IV Intermittent every 24 hours  docusate sodium 100 milliGRAM(s) Oral two times a day  heparin  Injectable 5000 Unit(s) SubCutaneous every 8 hours  insulin glargine Injectable (LANTUS) 23 Unit(s) SubCutaneous at bedtime  insulin lispro (HumaLOG) corrective regimen sliding scale   SubCutaneous three times a day before meals  insulin lispro (HumaLOG) corrective regimen sliding scale   SubCutaneous at bedtime  insulin lispro Injectable (HumaLOG) 6 Unit(s) SubCutaneous three times a day before meals  metoclopramide Injectable 10 milliGRAM(s) IV Push once  senna 2 Tablet(s) Oral daily  senna      simethicone 80 milliGRAM(s) Chew every 12 hours    MEDICATIONS  (PRN):  acetaminophen   Tablet .. 650 milliGRAM(s) Oral every 6 hours PRN Mild Pain (1 - 3), Moderate Pain (4 - 6)  ketorolac   Injectable 15 milliGRAM(s) IV Push every 6 hours PRN Severe Pain (7 - 10)  ondansetron Injectable 4 milliGRAM(s) IV Push every 6 hours PRN Nausea and/or Vomiting      REVIEW OF SYSTEMS:  GENERAL: No fever, no chills, no night sweats, no weight gain or loss  EYES: no change in vision, no blurred vision  HEENT: no trouble swallowing, no trouble speaking, no congestion, no sore throat  NECK: no pain or stiffness  CV: no chest pain or palpitations  RESP: no cough, no shortness of breath  GI: +abdominal pain in epigastrum and RUQ, +nausea, +vomiting, no diarrhea, no constipation, no BRBPR or melena  : No dysuria, no frequency  NEURO: no headache, no weakness, no dizziness  SKIN: no rashes    LABS:                        10.7   7.13  )-----------( 447      ( 01 Aug 2019 06:40 )             31.9     08-    138  |  98  |  6<L>  ----------------------------<  204<H>  3.8   |  28  |  0.85    Ca    9.3      01 Aug 2019 06:40  Phos  3.6     08-  Mg     2.0     08-    TPro  7.2  /  Alb  3.2<L>  /  TBili  0.4  /  DBili  x   /  AST  6   /  ALT  5   /  AlkPhos  77  07-    LIVER FUNCTIONS - ( 2019 07:20 )  Alb: 3.2 g/dL / Pro: 7.2 g/dL / ALK PHOS: 77 u/L / ALT: 5 u/L / AST: 6 u/L / GGT: x     / T. Bili 0.4 mg/dL / D. Bili x             CAPILLARY BLOOD GLUCOSE      POCT Blood Glucose.: 215 mg/dL (2019 22:32)  POCT Blood Glucose.: 209 mg/dL (2019 17:48)  POCT Blood Glucose.: 151 mg/dL (2019 12:30)      RADIOLOGY & ADDITIONAL TESTS: Patient is a 48y old  Female who presents with a chief complaint of DKA, pyelonephritis (01 Aug 2019 07:49)      INTERVAL HPI/OVERNIGHT EVENTS:  Patient had difficulty resting and increased abdominal pain despite continued ambulation last night, and was nauseous overnight with burping and vomiting multiple times this morning with food contents in the basin when examined.  She once again denies that this is a chronic problem that she has never had before.  Now localized to epigastrum and RUQ.  Gave reglan with some resolution of nausea, and ordered RUQ u/s.  Reordered simethicone and verified with nursing she would get her q12h dulcolax which they were not giving because she was not endorsing constipation.    T(C): 36.5 (19 @ 05:37), Max: 37.1 (19 @ 13:15)  HR: 77 (19 @ 05:37) (73 - 84)  BP: 139/75 (19 @ 05:37) (132/75 - 139/75)  RR: 18 (19 @ 05:37) (17 - 18)  SpO2: 98% (19 @ 05:37) (97% - 100%)  Wt(kg): --  I&O's Summary      PHYSICAL EXAM:  GENERAL: Sitting in bed with basin vomiting, clear vomitus with partially digested food present in basin  HEAD:  Atraumatic, Normocephalic  EYES: Enucleated R pupil, EOMs intact b/l   ENMT: Moist Mucus membranes  NERVOUS SYSTEM:  A&Ox4, Normal Motor strenght in b/l Upper and lower extremities, gross sensation to light tough intact in b/l upper and lower extremities, CNs II-XII intact b/l w/out focal deficits.  CHEST/LUNG: CTAB no w/r/r  HEART: RRR no m/g/r  ABDOMEN: +BS, soft, nondistended, tenderness in epigastrum and RUQ  EXTREMITIES:  +2 radial pulses b/l, no LE edema, no calf tenderness  LYMPH: No anterior/Posterior or supraclavicular LAD  SKIN: No new rashes or DTIs, warm, dry, and intact    PAST MEDICAL & SURGICAL HISTORY:  Diabetes mellitus type I  Cyst of right ovary  Diabetes  Eye injury  History of salpingoophorectomy: Right-sided  History of eye enucleation: right  H/O  section  Prosthetic eye globe   delivery delivered      MEDICATIONS  (STANDING):  bisacodyl 5 milliGRAM(s) Oral every 12 hours  cefTRIAXone   IVPB 1000 milliGRAM(s) IV Intermittent every 24 hours  docusate sodium 100 milliGRAM(s) Oral two times a day  heparin  Injectable 5000 Unit(s) SubCutaneous every 8 hours  insulin glargine Injectable (LANTUS) 23 Unit(s) SubCutaneous at bedtime  insulin lispro (HumaLOG) corrective regimen sliding scale   SubCutaneous three times a day before meals  insulin lispro (HumaLOG) corrective regimen sliding scale   SubCutaneous at bedtime  insulin lispro Injectable (HumaLOG) 6 Unit(s) SubCutaneous three times a day before meals  metoclopramide Injectable 10 milliGRAM(s) IV Push once  senna 2 Tablet(s) Oral daily  senna      simethicone 80 milliGRAM(s) Chew every 12 hours    MEDICATIONS  (PRN):  acetaminophen   Tablet .. 650 milliGRAM(s) Oral every 6 hours PRN Mild Pain (1 - 3), Moderate Pain (4 - 6)  ketorolac   Injectable 15 milliGRAM(s) IV Push every 6 hours PRN Severe Pain (7 - 10)  ondansetron Injectable 4 milliGRAM(s) IV Push every 6 hours PRN Nausea and/or Vomiting      REVIEW OF SYSTEMS:  GENERAL: No fever, no chills, no night sweats, no weight gain or loss  EYES: no change in vision, no blurred vision  HEENT: no trouble swallowing, no trouble speaking, no congestion, no sore throat  NECK: no pain or stiffness  CV: no chest pain or palpitations  RESP: no cough, no shortness of breath  GI: +abdominal pain in epigastrum and RUQ, +nausea, +vomiting, no diarrhea, no constipation, no BRBPR or melena  : No dysuria, no frequency  NEURO: no headache, no weakness, no dizziness  SKIN: no rashes    LABS:                        10.7   7.13  )-----------( 447      ( 01 Aug 2019 06:40 )             31.9     08-01    138  |  98  |  6<L>  ----------------------------<  204<H>  3.8   |  28  |  0.85    Ca    9.3      01 Aug 2019 06:40  Phos  3.6     08-  Mg     2.0     08-    TPro  7.2  /  Alb  3.2<L>  /  TBili  0.4  /  DBili  x   /  AST  6   /  ALT  5   /  AlkPhos  77  07    LIVER FUNCTIONS - ( 2019 07:20 )  Alb: 3.2 g/dL / Pro: 7.2 g/dL / ALK PHOS: 77 u/L / ALT: 5 u/L / AST: 6 u/L / GGT: x     / T. Bili 0.4 mg/dL / D. Bili x             CAPILLARY BLOOD GLUCOSE      POCT Blood Glucose.: 215 mg/dL (2019 22:32)  POCT Blood Glucose.: 209 mg/dL (2019 17:48)  POCT Blood Glucose.: 151 mg/dL (2019 12:30)      RADIOLOGY & ADDITIONAL TESTS: Patient is a 48y old  Female who presents with a chief complaint of DKA, pyelonephritis (01 Aug 2019 07:49)      INTERVAL HPI/OVERNIGHT EVENTS:  Patient had difficulty resting and increased abdominal pain despite continued ambulation last night, and was nauseous overnight with burping and vomiting multiple times this morning with food contents in the basin when examined.  She once again denies that this is a chronic problem and says that she has never had before.  Now localized to epigastrum and RUQ.  Gave reglan with some resolution of nausea, and ordered RUQ u/s.  Reordered simethicone and verified with nursing she would get her q12h dulcolax which they were not giving because she was not endorsing constipation.    T(C): 36.5 (19 @ 05:37), Max: 37.1 (19 @ 13:15)  HR: 77 (19 @ 05:37) (73 - 84)  BP: 139/75 (19 @ 05:37) (132/75 - 139/75)  RR: 18 (19 @ 05:37) (17 - 18)  SpO2: 98% (19 @ 05:37) (97% - 100%)  Wt(kg): --  I&O's Summary      PHYSICAL EXAM:  GENERAL: Sitting in bed with basin vomiting, clear vomitus with partially digested food present in basin  HEAD:  Atraumatic, Normocephalic  EYES: Enucleated R pupil, EOMs intact b/l   ENMT: Moist Mucus membranes  NERVOUS SYSTEM:  A&Ox4, Normal Motor strenght in b/l Upper and lower extremities, gross sensation to light tough intact in b/l upper and lower extremities, CNs II-XII intact b/l w/out focal deficits.  CHEST/LUNG: CTAB no w/r/r  HEART: RRR no m/g/r  ABDOMEN: +BS, soft, nondistended, tenderness in epigastrum and RUQ  EXTREMITIES:  +2 radial pulses b/l, no LE edema, no calf tenderness  LYMPH: No anterior/Posterior or supraclavicular LAD  SKIN: No new rashes or DTIs, warm, dry, and intact    PAST MEDICAL & SURGICAL HISTORY:  Diabetes mellitus type I  Cyst of right ovary  Diabetes  Eye injury  History of salpingoophorectomy: Right-sided  History of eye enucleation: right  H/O  section  Prosthetic eye globe   delivery delivered      MEDICATIONS  (STANDING):  bisacodyl 5 milliGRAM(s) Oral every 12 hours  cefTRIAXone   IVPB 1000 milliGRAM(s) IV Intermittent every 24 hours  docusate sodium 100 milliGRAM(s) Oral two times a day  heparin  Injectable 5000 Unit(s) SubCutaneous every 8 hours  insulin glargine Injectable (LANTUS) 23 Unit(s) SubCutaneous at bedtime  insulin lispro (HumaLOG) corrective regimen sliding scale   SubCutaneous three times a day before meals  insulin lispro (HumaLOG) corrective regimen sliding scale   SubCutaneous at bedtime  insulin lispro Injectable (HumaLOG) 6 Unit(s) SubCutaneous three times a day before meals  metoclopramide Injectable 10 milliGRAM(s) IV Push once  senna 2 Tablet(s) Oral daily  senna      simethicone 80 milliGRAM(s) Chew every 12 hours    MEDICATIONS  (PRN):  acetaminophen   Tablet .. 650 milliGRAM(s) Oral every 6 hours PRN Mild Pain (1 - 3), Moderate Pain (4 - 6)  ketorolac   Injectable 15 milliGRAM(s) IV Push every 6 hours PRN Severe Pain (7 - 10)  ondansetron Injectable 4 milliGRAM(s) IV Push every 6 hours PRN Nausea and/or Vomiting      REVIEW OF SYSTEMS:  GENERAL: No fever, no chills, no night sweats, no weight gain or loss  EYES: no change in vision, no blurred vision  HEENT: no trouble swallowing, no trouble speaking, no congestion, no sore throat  NECK: no pain or stiffness  CV: no chest pain or palpitations  RESP: no cough, no shortness of breath  GI: +abdominal pain in epigastrum and RUQ, +nausea, +vomiting, no diarrhea, no constipation, no BRBPR or melena  : No dysuria, no frequency  NEURO: no headache, no weakness, no dizziness  SKIN: no rashes    LABS:                        10.7   7.13  )-----------( 447      ( 01 Aug 2019 06:40 )             31.9     08-01    138  |  98  |  6<L>  ----------------------------<  204<H>  3.8   |  28  |  0.85    Ca    9.3      01 Aug 2019 06:40  Phos  3.6     08-  Mg     2.0     08-    TPro  7.2  /  Alb  3.2<L>  /  TBili  0.4  /  DBili  x   /  AST  6   /  ALT  5   /  AlkPhos  77  07    LIVER FUNCTIONS - ( 2019 07:20 )  Alb: 3.2 g/dL / Pro: 7.2 g/dL / ALK PHOS: 77 u/L / ALT: 5 u/L / AST: 6 u/L / GGT: x     / T. Bili 0.4 mg/dL / D. Bili x             CAPILLARY BLOOD GLUCOSE      POCT Blood Glucose.: 215 mg/dL (2019 22:32)  POCT Blood Glucose.: 209 mg/dL (2019 17:48)  POCT Blood Glucose.: 151 mg/dL (2019 12:30)

## 2019-08-01 NOTE — PROGRESS NOTE ADULT - PROBLEM SELECTOR PROBLEM 2
Type 1 diabetes mellitus with ketoacidosis without coma Diabetic ketoacidosis without coma associated with type 1 diabetes mellitus

## 2019-08-01 NOTE — PROGRESS NOTE ADULT - PROBLEM SELECTOR PLAN 3
- L flank pain, WBC 19 and fever 100.5 on admission.  CT abd/pelv showed perinephric fat stranding concerning for bilateral pyelonephritis.  - UCx no growth, BCx no growth.  - Discontinue Zosyn q8H (Day 5/10) and transition to oral Augmentin BID 7/31 for total 10 day course of antibiotics, ending 8/5/2019 - L flank pain, WBC 19 and fever 100.5 on admission.  CT abd/pelv showed perinephric fat stranding concerning for bilateral pyelonephritis.  - UCx no growth, BCx no growth.  - Discontinue Zosyn q8H (Day 5/10) and transition to oral Augmentin BID 7/31 for total 10 day course of antibiotics, ending 8/5/2019.  - Was transitioned from Zosyn q8H 7/31 to oral Augmentin, but due to repeated vomiting today have restarted IV antibiotics with ceftriaxone (Day 6/7); will give total 7 days. -fasting am fingerstick improved with glargine 23 units last night; continue same dose tonight  -continue lispro 6qac; decrease pre-meal insulin if patient eating less-appetite improving today -fasting am fingerstick improved with glargine 23 units 7/30; increase to 24u 8/1.  -continue lispro 6qac; decrease pre-meal insulin if patient eating less-appetite improving today - Was transitioned from Zosyn q8H 7/31 to oral Augmentin, but due to repeated vomiting today have restarted IV antibiotics with ceftriaxone; will give total 7 days antibiotics, today is 6/7.

## 2019-08-01 NOTE — PROGRESS NOTE ADULT - SUBJECTIVE AND OBJECTIVE BOX
Chief Complaint: DM 1 uncontrolled with hyperglycemia     History: Patient reports decreased food intake, she says she does not like the food choices. Fingersticks trending better controlled today, fasting serum glucose slightly elevated at 204. Patient reports her PCP has scheduled her with an endocrinologist with [facility] for post hospital follow up. She cannot recall name but wants to continue with that plan.     MEDICATIONS  (STANDING):  bisacodyl 5 milliGRAM(s) Oral every 12 hours  cefTRIAXone   IVPB 1000 milliGRAM(s) IV Intermittent every 24 hours  docusate sodium 100 milliGRAM(s) Oral two times a day  heparin  Injectable 5000 Unit(s) SubCutaneous every 8 hours  insulin glargine Injectable (LANTUS) 23 Unit(s) SubCutaneous at bedtime  insulin lispro (HumaLOG) corrective regimen sliding scale   SubCutaneous three times a day before meals  insulin lispro (HumaLOG) corrective regimen sliding scale   SubCutaneous at bedtime  insulin lispro Injectable (HumaLOG) 6 Unit(s) SubCutaneous three times a day before meals  metoclopramide Injectable 10 milliGRAM(s) IV Push every 6 hours  senna 2 Tablet(s) Oral daily  senna      simethicone 80 milliGRAM(s) Chew every 12 hours    MEDICATIONS  (PRN):  acetaminophen   Tablet .. 650 milliGRAM(s) Oral every 6 hours PRN Mild Pain (1 - 3), Moderate Pain (4 - 6)  ketorolac   Injectable 15 milliGRAM(s) IV Push every 6 hours PRN Severe Pain (7 - 10)  ondansetron Injectable 4 milliGRAM(s) IV Push every 6 hours PRN Nausea and/or Vomiting      No Known Allergies      Review of Systems:  Constitutional: No fever  Cardiovascular: No chest pain, palpitations  Respiratory: No SOB, no cough  GI: No nausea, vomiting, abdominal pain      PHYSICAL EXAM:  VITALS: T(C): 36.5 (08-01-19 @ 05:37)  T(F): 97.7 (08-01-19 @ 05:37), Max: 98.8 (07-31-19 @ 13:15)  HR: 77 (08-01-19 @ 05:37) (73 - 84)  BP: 139/75 (08-01-19 @ 05:37) (132/75 - 139/75)  RR:  (17 - 18)  SpO2:  (97% - 100%)  Wt(kg): --  GENERAL: NAD  RESPIRATORY: Non labored  PSYCH: Alert and oriented x 3, normal affect, normal mood      CAPILLARY BLOOD GLUCOSE      POCT Blood Glucose.: 170 mg/dL (01 Aug 2019 12:14)  POCT Blood Glucose.: 180 mg/dL (01 Aug 2019 09:47)  POCT Blood Glucose.: 215 mg/dL (31 Jul 2019 22:32)  POCT Blood Glucose.: 209 mg/dL (31 Jul 2019 17:48)      08-01    138  |  98  |  6<L>  ----------------------------<  204<H>  3.8   |  28  |  0.85    EGFR if : 95  EGFR if non : 82    Ca    9.3      08-01  Mg     2.0     08-01  Phos  3.6     08-01    TPro  7.2  /  Alb  3.2<L>  /  TBili  0.4  /  DBili  x   /  AST  6   /  ALT  5   /  AlkPhos  77  07-31              Hemoglobin A1C, Whole Blood: 9.6 % <H> [4.0 - 5.6] (07-23-19 @ 05:20)

## 2019-08-01 NOTE — CONSULT NOTE ADULT - ASSESSMENT
47 yo F with Pmhx of DMI, R eye enucleation, kidney stone and R ovarian cyst s/p salpingooophrectomy on 7/22/2019 presented with nausea/vomiting and RUQ pain, found to have DKA s/p MICU admission and insulin gtt and b/l pyelonephritis s/p abx course. However, pt is persistently experiencing RUQ pain with nausea and vomiting, most likely due to gastroparesis, functional constipation, or medication side effects.     Recs:   -RUQ pain in the setting of DKA and pyelonephritis, s/p optimal management. CT A/P and US of the abdomen showed no acute pathology. Pain improved with laxatives and anti-emetics.   -Please start patient on clear liquid diet for at least 3 to 5 days.   -Consider continuing proper bowel regimen and anti-emetics such as zofran/reglan/erythromycin.   -Please avoid opiates. Consider pain control with IV tylenol.   -Gastric emptying study once patient is stable-possibly as outpatient.    D/w the attending 49 yo F with Pmhx of DMI, R eye enucleation, kidney stone and R ovarian cyst s/p  presented with nausea/vomiting and RUQ pain, found to have DKA s/p MICU admission and insulin gtt and b/l pyelonephritis s/p abx course.     1.  Upper abdominal pain, nausea, vomiting.  Likely gastroparesis, due to Diabetes and recent opiate use. Differential includes DKA, medication side effect, effect of pyelonephritis.  2.  Diabetes mellitus x 20 years on insulin.  3.  Pyelonephritis.  4.  Recent salpingooophrectomy on 7/22/2019 for right ovarian cyst.    Recs:   - CT A/P and US of the abdomen reviewed, no acute pathology. Pain improved with laxatives and anti-emetics.   - If symptoms persist, would place patient on gastroparesis diet (liquids, low residue).  - Continue bowel regimen.  - Consider Reglan for antiemetic as needed.  - Please avoid opiates. Consider pain control with Tylenol.   - If stable, consider gastric emptying study as outpatient.  - If symptoms do not improve with treatment of underlying medical conditions, can consider endoscopic evaluation.

## 2019-08-01 NOTE — PROGRESS NOTE ADULT - PROBLEM SELECTOR PLAN 7
DVT ppx: SQH q8h Patient had recent right salpingo-oophrectomy     - CT abd/pelv and CT urogram do not demonstrate abscess or intraabdominal process, no ureteral injury.

## 2019-08-01 NOTE — PROGRESS NOTE ADULT - PROBLEM SELECTOR PLAN 1
-Resolved - Began around time of discharge from MICU, mostly in epigastrum but has been more in LUQ and RUQ.  At first thought to be related to constipation, - Began around time of discharge from MICU, mostly in epigastrum but has been more in LUQ and RUQ.    - At first thought to be related to constipation, and patient still feels bloated and gassy but has been having bowel movements since enema on 7/31.  - RUQ u/s not indicative of acute gallbladder pathology.  - Lipase and Liver panel pending 8/1; previous CMP not indicative of liver injury  - Nausea controlled with reglan and zofran q6h  - GI consulted, appreciate reqs  - Gyn service consulted to rule out any complications from recent salpingo-oophrectomy 7/22/2019. - Began around time of discharge from MICU, mostly in epigastrum but has been more in LUQ and RUQ.    - At first thought to be related to constipation, and patient still feels bloated and gassy but has been having bowel movements since enema on 7/31.  - RUQ u/s not indicative of acute gallbladder pathology.  - Lipase and Liver panel pending; previous CMP not indicative of liver injury  - Nausea controlled with reglan and zofran q6h  - GI consulted, appreciate reqs  - Gyn service consulted to rule out any complications from recent salpingo-oophrectomy 7/22/2019.

## 2019-08-02 DIAGNOSIS — K31.84 GASTROPARESIS: ICD-10-CM

## 2019-08-02 LAB
ANION GAP SERPL CALC-SCNC: 14 MMO/L — SIGNIFICANT CHANGE UP (ref 7–14)
BASOPHILS # BLD AUTO: 0.02 K/UL — SIGNIFICANT CHANGE UP (ref 0–0.2)
BASOPHILS NFR BLD AUTO: 0.3 % — SIGNIFICANT CHANGE UP (ref 0–2)
BUN SERPL-MCNC: 5 MG/DL — LOW (ref 7–23)
CALCIUM SERPL-MCNC: 10 MG/DL — SIGNIFICANT CHANGE UP (ref 8.4–10.5)
CHLORIDE SERPL-SCNC: 98 MMOL/L — SIGNIFICANT CHANGE UP (ref 98–107)
CO2 SERPL-SCNC: 27 MMOL/L — SIGNIFICANT CHANGE UP (ref 22–31)
CREAT SERPL-MCNC: 0.83 MG/DL — SIGNIFICANT CHANGE UP (ref 0.5–1.3)
EOSINOPHIL # BLD AUTO: 0.04 K/UL — SIGNIFICANT CHANGE UP (ref 0–0.5)
EOSINOPHIL NFR BLD AUTO: 0.7 % — SIGNIFICANT CHANGE UP (ref 0–6)
GLUCOSE SERPL-MCNC: 82 MG/DL — SIGNIFICANT CHANGE UP (ref 70–99)
HCT VFR BLD CALC: 31.1 % — LOW (ref 34.5–45)
HGB BLD-MCNC: 10.2 G/DL — LOW (ref 11.5–15.5)
IMM GRANULOCYTES NFR BLD AUTO: 0.5 % — SIGNIFICANT CHANGE UP (ref 0–1.5)
LYMPHOCYTES # BLD AUTO: 1.19 K/UL — SIGNIFICANT CHANGE UP (ref 1–3.3)
LYMPHOCYTES # BLD AUTO: 20.1 % — SIGNIFICANT CHANGE UP (ref 13–44)
MCHC RBC-ENTMCNC: 28 PG — SIGNIFICANT CHANGE UP (ref 27–34)
MCHC RBC-ENTMCNC: 32.8 % — SIGNIFICANT CHANGE UP (ref 32–36)
MCV RBC AUTO: 85.4 FL — SIGNIFICANT CHANGE UP (ref 80–100)
MONOCYTES # BLD AUTO: 0.67 K/UL — SIGNIFICANT CHANGE UP (ref 0–0.9)
MONOCYTES NFR BLD AUTO: 11.3 % — SIGNIFICANT CHANGE UP (ref 2–14)
NEUTROPHILS # BLD AUTO: 3.97 K/UL — SIGNIFICANT CHANGE UP (ref 1.8–7.4)
NEUTROPHILS NFR BLD AUTO: 67.1 % — SIGNIFICANT CHANGE UP (ref 43–77)
NRBC # FLD: 0 K/UL — SIGNIFICANT CHANGE UP (ref 0–0)
PLATELET # BLD AUTO: 485 K/UL — HIGH (ref 150–400)
PMV BLD: 10.6 FL — SIGNIFICANT CHANGE UP (ref 7–13)
POTASSIUM SERPL-MCNC: 3.4 MMOL/L — LOW (ref 3.5–5.3)
POTASSIUM SERPL-SCNC: 3.4 MMOL/L — LOW (ref 3.5–5.3)
RBC # BLD: 3.64 M/UL — LOW (ref 3.8–5.2)
RBC # FLD: 12.3 % — SIGNIFICANT CHANGE UP (ref 10.3–14.5)
SODIUM SERPL-SCNC: 139 MMOL/L — SIGNIFICANT CHANGE UP (ref 135–145)
WBC # BLD: 5.92 K/UL — SIGNIFICANT CHANGE UP (ref 3.8–10.5)
WBC # FLD AUTO: 5.92 K/UL — SIGNIFICANT CHANGE UP (ref 3.8–10.5)

## 2019-08-02 PROCEDURE — 99232 SBSQ HOSP IP/OBS MODERATE 35: CPT

## 2019-08-02 PROCEDURE — 99239 HOSP IP/OBS DSCHRG MGMT >30: CPT

## 2019-08-02 PROCEDURE — 99232 SBSQ HOSP IP/OBS MODERATE 35: CPT | Mod: GC

## 2019-08-02 RX ORDER — INSULIN ASPART 100 [IU]/ML
4 INJECTION, SOLUTION SUBCUTANEOUS
Qty: 0 | Refills: 0 | DISCHARGE

## 2019-08-02 RX ORDER — SIMETHICONE 80 MG/1
1 TABLET, CHEWABLE ORAL
Qty: 28 | Refills: 0
Start: 2019-08-02 | End: 2019-08-15

## 2019-08-02 RX ORDER — METOCLOPRAMIDE HCL 10 MG
2 TABLET ORAL
Qty: 84 | Refills: 0
Start: 2019-08-02 | End: 2019-08-15

## 2019-08-02 RX ORDER — INSULIN GLARGINE 100 [IU]/ML
20 INJECTION, SOLUTION SUBCUTANEOUS
Qty: 0 | Refills: 0 | DISCHARGE
Start: 2019-08-02

## 2019-08-02 RX ORDER — INSULIN LISPRO 100/ML
3 VIAL (ML) SUBCUTANEOUS ONCE
Refills: 0 | Status: COMPLETED | OUTPATIENT
Start: 2019-08-02 | End: 2019-08-02

## 2019-08-02 RX ORDER — INSULIN GLARGINE 100 [IU]/ML
12 INJECTION, SOLUTION SUBCUTANEOUS
Qty: 0 | Refills: 0 | DISCHARGE

## 2019-08-02 RX ORDER — POTASSIUM CHLORIDE 20 MEQ
40 PACKET (EA) ORAL ONCE
Refills: 0 | Status: COMPLETED | OUTPATIENT
Start: 2019-08-02 | End: 2019-08-02

## 2019-08-02 RX ORDER — INSULIN GLARGINE 100 [IU]/ML
20 INJECTION, SOLUTION SUBCUTANEOUS AT BEDTIME
Refills: 0 | Status: DISCONTINUED | OUTPATIENT
Start: 2019-08-02 | End: 2019-08-03

## 2019-08-02 RX ORDER — POLYETHYLENE GLYCOL 3350 17 G/17G
17 POWDER, FOR SOLUTION ORAL
Qty: 300 | Refills: 0
Start: 2019-08-02 | End: 2019-08-15

## 2019-08-02 RX ORDER — INSULIN GLARGINE 100 [IU]/ML
20 INJECTION, SOLUTION SUBCUTANEOUS AT BEDTIME
Refills: 0 | Status: DISCONTINUED | OUTPATIENT
Start: 2019-08-02 | End: 2019-08-02

## 2019-08-02 RX ORDER — INSULIN ASPART 100 [IU]/ML
6 INJECTION, SOLUTION SUBCUTANEOUS
Qty: 0 | Refills: 0 | DISCHARGE

## 2019-08-02 RX ORDER — ONDANSETRON 8 MG/1
1 TABLET, FILM COATED ORAL
Qty: 21 | Refills: 0
Start: 2019-08-02 | End: 2019-08-04

## 2019-08-02 RX ORDER — ONDANSETRON 8 MG/1
1 TABLET, FILM COATED ORAL
Qty: 21 | Refills: 0
Start: 2019-08-02 | End: 2019-08-08

## 2019-08-02 RX ORDER — INSULIN LISPRO 100/ML
4 VIAL (ML) SUBCUTANEOUS
Refills: 0 | Status: DISCONTINUED | OUTPATIENT
Start: 2019-08-02 | End: 2019-08-03

## 2019-08-02 RX ORDER — POLYETHYLENE GLYCOL 3350 17 G/17G
17 POWDER, FOR SOLUTION ORAL
Qty: 0 | Refills: 0 | DISCHARGE
Start: 2019-08-02

## 2019-08-02 RX ORDER — INSULIN ASPART 100 [IU]/ML
4 INJECTION, SOLUTION SUBCUTANEOUS
Qty: 0 | Refills: 0 | DISCHARGE
Start: 2019-08-02

## 2019-08-02 RX ADMIN — HEPARIN SODIUM 5000 UNIT(S): 5000 INJECTION INTRAVENOUS; SUBCUTANEOUS at 05:11

## 2019-08-02 RX ADMIN — Medication 10 MILLIGRAM(S): at 05:11

## 2019-08-02 RX ADMIN — SIMETHICONE 80 MILLIGRAM(S): 80 TABLET, CHEWABLE ORAL at 05:11

## 2019-08-02 RX ADMIN — Medication 5 MILLIGRAM(S): at 05:11

## 2019-08-02 RX ADMIN — Medication 40 MILLIEQUIVALENT(S): at 12:36

## 2019-08-02 RX ADMIN — Medication 6 UNIT(S): at 08:43

## 2019-08-02 RX ADMIN — Medication 3 UNIT(S): at 13:24

## 2019-08-02 RX ADMIN — Medication 10 MILLIGRAM(S): at 12:36

## 2019-08-02 RX ADMIN — CEFTRIAXONE 100 MILLIGRAM(S): 500 INJECTION, POWDER, FOR SOLUTION INTRAMUSCULAR; INTRAVENOUS at 12:36

## 2019-08-02 RX ADMIN — INSULIN GLARGINE 20 UNIT(S): 100 INJECTION, SOLUTION SUBCUTANEOUS at 21:21

## 2019-08-02 RX ADMIN — SIMETHICONE 80 MILLIGRAM(S): 80 TABLET, CHEWABLE ORAL at 18:10

## 2019-08-02 RX ADMIN — Medication 100 MILLIGRAM(S): at 05:11

## 2019-08-02 RX ADMIN — Medication 4 UNIT(S): at 18:10

## 2019-08-02 RX ADMIN — Medication 10 MILLIGRAM(S): at 18:10

## 2019-08-02 NOTE — PROGRESS NOTE ADULT - PROBLEM SELECTOR PLAN 1
- Nausea and vomiting improved with Reglan and Zofran  - GI on board, will perform gastric emptying study outpatient  - Diet reduced to clears with glucerna supplement, patient tolerating - Nausea and vomiting improved with Reglan and Zofran, abdominal pain resolved  - GI on board, will perform gastric emptying study outpatient  - Patient tolerating clears with glucerna supplement, will advance to softs for lunch and re-evaluate

## 2019-08-02 NOTE — PROGRESS NOTE ADULT - PROBLEM SELECTOR PLAN 5
- Patient had 2.5g drop in Hgb, no active source of bleed.  May be dilutional.   - Labwork for hemolysis obtained, no sign of hemolysis, iron studies consistent with anemia of chronic inflammatory disease. Patient had recent right salpingo-oophrectomy     - CT abd/pelv and CT urogram do not demonstrate abscess or intraabdominal process, no ureteral injury.

## 2019-08-02 NOTE — PROGRESS NOTE ADULT - ASSESSMENT
Patient is a 47 yo woman with type 1 diabetes admitted for DKA found to have pyelonephritis       T1 DM, uncontrolled  - target bg 100-180 mg/dl  - below goal fasting and hypoglycemia prior to lunch    - agree with primary team decreasing lantus to 20 units sq qhs  - decreased humalog to 4 units sq tid ac  - one time dose of humalog 3 units prior to lunch today, hypoglycemia treated/resolved   - c/w humalog low correction scale ac and HS  - diet to be advanced - insulin requirements may now increase     DC planning- pt can follow up with her private endocrinologist.  Would be discharged on b/b- doses TBD. See full consult for complete plan of care. Patient is a 47 yo woman with type 1 diabetes admitted for DKA found to have pyelonephritis       T1 DM, uncontrolled  - target bg 100-180 mg/dl  - below goal fasting and hypoglycemia prior to lunch    - agree with primary team decreasing lantus to 20 units sq qhs  - decreased humalog to 4 units sq tid ac  - one time dose of humalog 3 units prior to lunch today, hypoglycemia treated/resolved (given)  - c/w humalog low correction scale ac and HS  - diet to be advanced - insulin requirements may now increase     DC planning- pt can follow up with her private endocrinologist.  Would be discharged on b/b- doses TBD. See full consult for complete plan of care.

## 2019-08-02 NOTE — PROGRESS NOTE ADULT - PROBLEM SELECTOR PLAN 4
- Continue dulcolax BID - Patient had 2.5g drop in Hgb, no active source of bleed.  May be dilutional.   - Labwork for hemolysis obtained, no sign of hemolysis, iron studies consistent with anemia of chronic inflammatory disease.

## 2019-08-02 NOTE — PROGRESS NOTE ADULT - SUBJECTIVE AND OBJECTIVE BOX
Chief Complaint:  Patient is a 48y old  Female who presents with a chief complaint of DKA, pyelonephritis (02 Aug 2019 06:40)      Interval Events:     Allergies:  No Known Allergies      Home Medications:    Hospital Medications:  acetaminophen   Tablet .. 650 milliGRAM(s) Oral every 6 hours PRN  acetaminophen  IVPB .. 1000 milliGRAM(s) IV Intermittent once PRN  bisacodyl 5 milliGRAM(s) Oral every 12 hours  cefTRIAXone   IVPB 1000 milliGRAM(s) IV Intermittent every 24 hours  docusate sodium 100 milliGRAM(s) Oral two times a day  heparin  Injectable 5000 Unit(s) SubCutaneous every 8 hours  insulin glargine Injectable (LANTUS) 24 Unit(s) SubCutaneous at bedtime  insulin lispro (HumaLOG) corrective regimen sliding scale   SubCutaneous three times a day before meals  insulin lispro (HumaLOG) corrective regimen sliding scale   SubCutaneous at bedtime  insulin lispro Injectable (HumaLOG) 6 Unit(s) SubCutaneous three times a day before meals  ketorolac   Injectable 15 milliGRAM(s) IV Push every 6 hours PRN  metoclopramide Injectable 10 milliGRAM(s) IV Push every 6 hours  ondansetron Injectable 4 milliGRAM(s) IV Push every 6 hours PRN  polyethylene glycol 3350 17 Gram(s) Oral daily  simethicone 80 milliGRAM(s) Chew every 12 hours      PMHX/PSHX:  Diabetes mellitus type I  Cyst of right ovary  Diabetes  Eye injury  History of salpingoophorectomy  History of eye enucleation  H/O  section  Prosthetic eye globe   delivery delivered      Family history:  No pertinent family history in first degree relatives  No pertinent family history in first degree relatives      ROS:     General:  No wt loss, fevers, chills, night sweats, fatigue,   Eyes:  Good vision, no reported pain  ENT:  No sore throat, pain, runny nose, dysphagia  CV:  No pain, palpitations, hypo/hypertension  Resp:  No dyspnea, cough, tachypnea, wheezing  GI:  No pain, No nausea, No vomiting, No diarrhea, No constipation, No weight loss, No fever, No pruritis, No rectal bleeding, No tarry stools, No dysphagia,  :  No pain, bleeding, incontinence, nocturia  Muscle:  No pain, weakness  Neuro:  No weakness, tingling, memory problems  Psych:  No fatigue, insomnia, mood problems, depression  Endocrine:  No polyuria, polydipsia, cold/heat intolerance  Heme:  No petechiae, ecchymosis, easy bruisability  Skin:  No rash, tattoos, scars, edema      PHYSICAL EXAM:   Vital Signs:  Vital Signs Last 24 Hrs  T(C): 36.8 (02 Aug 2019 04:37), Max: 36.8 (01 Aug 2019 14:00)  T(F): 98.2 (02 Aug 2019 04:37), Max: 98.3 (01 Aug 2019 14:00)  HR: 77 (02 Aug 2019 04:37) (77 - 84)  BP: 129/60 (02 Aug 2019 04:37) (127/72 - 133/71)  BP(mean): --  RR: 16 (02 Aug 2019 04:37) (16 - 18)  SpO2: 100% (02 Aug 2019 04:37) (100% - 100%)  Daily     Daily     GENERAL:  NAD, Appears stated age  HEENT:  NC/AT,  conjunctivae clear and pink, sclera -anicteric  CHEST:  CTA B/L, Normal effort  HEART:  RRR S1/S2, No murmurs  ABDOMEN:  Soft, non-tender, non-distended, normoactive bowel sounds,  no masses   EXTEREMITIES:  No cyanosis or Edema  SKIN:  Warm & Dry. No rash or erythema  NEURO:  Alert, oriented    LABS:                        10.2   5.92  )-----------( 485      ( 02 Aug 2019 06:00 )             31.1     08-02    139  |  98  |  5<L>  ----------------------------<  82  3.4<L>   |  27  |  0.83    Ca    10.0      02 Aug 2019 06:00  Phos  3.6     08-  Mg     2.0     08-    TPro  7.1  /  Alb  3.1<L>  /  TBili  0.2  /  DBili  < 0.2  /  AST  17  /  ALT  15  /  AlkPhos  74  08-01    LIVER FUNCTIONS - ( 01 Aug 2019 06:40 )  Alb: 3.1 g/dL / Pro: 7.1 g/dL / ALK PHOS: 74 u/L / ALT: 15 u/L / AST: 17 u/L / GGT: x                   Imaging: Chief Complaint:  Patient is a 48y old  Female who presents with a chief complaint of DKA, pyelonephritis (02 Aug 2019 06:40)      Interval Events:   Patients N/V much improved.  Tolerating PO today  No other complaints    Allergies:  No Known Allergies      Home Medications:    Hospital Medications:  acetaminophen   Tablet .. 650 milliGRAM(s) Oral every 6 hours PRN  acetaminophen  IVPB .. 1000 milliGRAM(s) IV Intermittent once PRN  bisacodyl 5 milliGRAM(s) Oral every 12 hours  cefTRIAXone   IVPB 1000 milliGRAM(s) IV Intermittent every 24 hours  docusate sodium 100 milliGRAM(s) Oral two times a day  heparin  Injectable 5000 Unit(s) SubCutaneous every 8 hours  insulin glargine Injectable (LANTUS) 24 Unit(s) SubCutaneous at bedtime  insulin lispro (HumaLOG) corrective regimen sliding scale   SubCutaneous three times a day before meals  insulin lispro (HumaLOG) corrective regimen sliding scale   SubCutaneous at bedtime  insulin lispro Injectable (HumaLOG) 6 Unit(s) SubCutaneous three times a day before meals  ketorolac   Injectable 15 milliGRAM(s) IV Push every 6 hours PRN  metoclopramide Injectable 10 milliGRAM(s) IV Push every 6 hours  ondansetron Injectable 4 milliGRAM(s) IV Push every 6 hours PRN  polyethylene glycol 3350 17 Gram(s) Oral daily  simethicone 80 milliGRAM(s) Chew every 12 hours      PMHX/PSHX:  Diabetes mellitus type I  Cyst of right ovary  Diabetes  Eye injury  History of salpingoophorectomy  History of eye enucleation  H/O  section  Prosthetic eye globe   delivery delivered      Family history:  No pertinent family history in first degree relatives  No pertinent family history in first degree relatives      ROS:     General:  No wt loss, fevers, chills, night sweats, fatigue,   Eyes:  Good vision, no reported pain  ENT:  No sore throat, pain, runny nose, dysphagia  CV:  No pain, palpitations, hypo/hypertension  Resp:  No dyspnea, cough, tachypnea, wheezing  GI:  No pain, No nausea, No vomiting, No diarrhea, No constipation, No weight loss, No fever, No pruritis, No rectal bleeding, No tarry stools, No dysphagia,  :  No pain, bleeding, incontinence, nocturia  Muscle:  No pain, weakness  Neuro:  No weakness, tingling, memory problems  Psych:  No fatigue, insomnia, mood problems, depression  Endocrine:  No polyuria, polydipsia, cold/heat intolerance  Heme:  No petechiae, ecchymosis, easy bruisability  Skin:  No rash, tattoos, scars, edema      PHYSICAL EXAM:   Vital Signs:  Vital Signs Last 24 Hrs  T(C): 36.8 (02 Aug 2019 04:37), Max: 36.8 (01 Aug 2019 14:00)  T(F): 98.2 (02 Aug 2019 04:37), Max: 98.3 (01 Aug 2019 14:00)  HR: 77 (02 Aug 2019 04:37) (77 - 84)  BP: 129/60 (02 Aug 2019 04:37) (127/72 - 133/71)  BP(mean): --  RR: 16 (02 Aug 2019 04:37) (16 - 18)  SpO2: 100% (02 Aug 2019 04:37) (100% - 100%)  Daily     Daily     GENERAL:  NAD, Appears stated age  HEENT:  NC/AT,  conjunctivae clear and pink, sclera -anicteric  CHEST:  CTA B/L, Normal effort  HEART:  RRR S1/S2, No murmurs  ABDOMEN:  Soft, non-tender, non-distended, normoactive bowel sounds,  no masses   EXTEREMITIES:  No cyanosis or Edema  SKIN:  Warm & Dry. No rash or erythema  NEURO:  Alert, oriented    LABS:                        10.2   5.92  )-----------( 485      ( 02 Aug 2019 06:00 )             31.1     08-02    139  |  98  |  5<L>  ----------------------------<  82  3.4<L>   |  27  |  0.83    Ca    10.0      02 Aug 2019 06:00  Phos  3.6     08-  Mg     2.0     08-    TPro  7.1  /  Alb  3.1<L>  /  TBili  0.2  /  DBili  < 0.2  /  AST  17  /  ALT  15  /  AlkPhos  74  08-01    LIVER FUNCTIONS - ( 01 Aug 2019 06:40 )  Alb: 3.1 g/dL / Pro: 7.1 g/dL / ALK PHOS: 74 u/L / ALT: 15 u/L / AST: 17 u/L / GGT: x                   Imaging:

## 2019-08-02 NOTE — PROGRESS NOTE ADULT - SUBJECTIVE AND OBJECTIVE BOX
Chief Complaint: DM 1 uncontrolled with hypoglycemia     History: Hypoglycemia at lunch. Resolved with juice - management as per hypoglycemia protocol.     MEDICATIONS  (STANDING):  bisacodyl 5 milliGRAM(s) Oral every 12 hours  cefTRIAXone   IVPB 1000 milliGRAM(s) IV Intermittent every 24 hours  docusate sodium 100 milliGRAM(s) Oral two times a day  heparin  Injectable 5000 Unit(s) SubCutaneous every 8 hours  insulin glargine Injectable (LANTUS) 24 Unit(s) SubCutaneous at bedtime  insulin lispro (HumaLOG) corrective regimen sliding scale   SubCutaneous three times a day before meals  insulin lispro (HumaLOG) corrective regimen sliding scale   SubCutaneous at bedtime  insulin lispro Injectable (HumaLOG) 6 Unit(s) SubCutaneous three times a day before meals  metoclopramide Injectable 10 milliGRAM(s) IV Push every 6 hours  polyethylene glycol 3350 17 Gram(s) Oral daily  simethicone 80 milliGRAM(s) Chew every 12 hours    MEDICATIONS  (PRN):  acetaminophen   Tablet .. 650 milliGRAM(s) Oral every 6 hours PRN Mild Pain (1 - 3), Moderate Pain (4 - 6)  acetaminophen  IVPB .. 1000 milliGRAM(s) IV Intermittent once PRN Severe Pain (7 - 10)  ketorolac   Injectable 15 milliGRAM(s) IV Push every 6 hours PRN Severe Pain (7 - 10)  ondansetron Injectable 4 milliGRAM(s) IV Push every 6 hours PRN Nausea and/or Vomiting        No Known Allergies      Review of Systems:  Constitutional: No fever  Cardiovascular: No chest pain, palpitations  Respiratory: No SOB, no cough  GI: No nausea, vomiting, abdominal pain      PHYSICAL EXAM:  VITALS: T(C): 36.8 (08-02-19 @ 04:37)  T(F): 98.2 (08-02-19 @ 04:37), Max: 98.2 (08-02-19 @ 04:37)  HR: 77 (08-02-19 @ 04:37) (77 - 84)  BP: 129/60 (08-02-19 @ 04:37) (129/60 - 133/71)  RR:  (16 - 18)  SpO2:  (100% - 100%)  Wt(kg): --  GENERAL: NAD  RESPIRATORY: Non labored  PSYCH: Alert and oriented x 3, normal affect, normal mood      CAPILLARY BLOOD GLUCOSE      POCT Blood Glucose.: 131 mg/dL (02 Aug 2019 12:52)  POCT Blood Glucose.: 131 mg/dL (02 Aug 2019 12:50)  POCT Blood Glucose.: 91 mg/dL (02 Aug 2019 12:32)  POCT Blood Glucose.: 100 mg/dL (02 Aug 2019 12:31)  POCT Blood Glucose.: 59 mg/dL (02 Aug 2019 12:15)  POCT Blood Glucose.: 59 mg/dL (02 Aug 2019 12:13)  POCT Blood Glucose.: 108 mg/dL (02 Aug 2019 08:31)  POCT Blood Glucose.: 80 mg/dL (02 Aug 2019 07:43)  POCT Blood Glucose.: 136 mg/dL (01 Aug 2019 22:14)  POCT Blood Glucose.: 183 mg/dL (01 Aug 2019 17:27)      08-02    139  |  98  |  5<L>  ----------------------------<  82  3.4<L>   |  27  |  0.83    EGFR if : 97  EGFR if non : 84    Ca    10.0      08-02  Mg     2.0     08-01  Phos  3.6     08-01    TPro  7.1  /  Alb  3.1<L>  /  TBili  0.2  /  DBili  < 0.2  /  AST  17  /  ALT  15  /  AlkPhos  74  08-01          Hemoglobin A1C, Whole Blood: 9.6 % <H> [4.0 - 5.6] (07-23-19 @ 05:20)

## 2019-08-02 NOTE — PROGRESS NOTE ADULT - PROBLEM SELECTOR PLAN 2
-fasting am fingerstick improved, increase glargine to 24u.  -continue lispro 6qac; decrease pre-meal insulin if patient eating less-appetite improving today -fasting am fingerstick mildly hypoglycemic at 80, patient is only taking clear liquids; will decrease glargine to 21u.  Pre-lunch glucose was 91.  -continue lispro 6qac; decrease pre-meal insulin if patient eating less-appetite improving today -fasting am fingerstick of 80, patient is only taking clear liquids; will decrease glargine to 20u.  Pre-lunch glucose was 91.  -continue lispro 6qac; decrease pre-meal insulin if patient eating less-appetite improving today

## 2019-08-02 NOTE — PROGRESS NOTE ADULT - ATTENDING COMMENTS
48 F with DM1 on insulin, had R salpingo-oophorectomy last week for R ovarian cyst now here with left flank pain and fever, sepsis due to pyelonephritis and DKA.  AG improving, on broad abx for pyelo, f/u cultures (covering gram negatives at this point). Case d/w gyn who wanted CT ureterogram, which did not show ureteral injury.  They do not suspect ovarian torsion.  Continue with pain control, transition to basal-bolus insulin.
Pt examined b y me--left CVAT.  Rev CT images with radiologits--left adnexa NORMAL --this seen intraop as well-R/o ureteral injury--CT urogram ordered
I have seen and evaluated the patient with the GI Fellow and GI Team.  I agree with the findings, formulation and plan of care as documented in the resident / fellows note and edited where appropriate.
48 year old female with DM1 s/p MICU for DKA, now resolved, with b/l pyleonephritis on Zosyn day #4 with persistent abdominal discomfort resulting in decreased oral intake. Patient feels it is due to combination of gas and constipation. Vitals stable, abdominal exam benign. Will escalate bowel regimen and encouraged OOB and glucerna.
48 year old female with DM1 admitted with DKA in setting of possible b/l pyelonephritis on POD#5 from right salpingo oophorectomy for ovarian torsion.    #DKA- Patient transferred from MICU to floor and insulin drip transitioned to SQ insulin.  FS in 200-300's s/p glargine last night and supplemental insulin today with little oral intake due to epigastric and flank pain. Plan to increase glargine this evening and will review endo recommendations.    #Epigastric and left sided flank pain-possible components of gas and constipation in addition to underlying renal infection; cont simethicone and bowel regimen, change from opiates to NSAID pain medication; encourage OOB      #Anemia-patient has 2.5g drop in Hb during past 2 days. No active source of bleeding. Will check labs for evidence of hemolysis and monitor.    #Pyelonephritis-day #3 of zosyn, urine and blood cultures NGTD; plan to narrow antibiotic coverage if clinically stable tomorrow
48 year old female with history DM1 s/p episode of DKA in setting of b/l pyelonephritis and recent gyn surgery with decreased oral intake secondary to epigastric gas pain and constipation. Patient had some relief with bowel movement and utilized nutritional supplement yesterday/walked around. Encouraged continued OOB, use of supplements, attention to moving bowels for continued improvement and possible d/c tomorrow or Friday.
48 year old female with history DM1 s/p episode of DKA in setting of b/l pyelonephritis and recent gyn surgery with persistent abdominal discomfort, likely gastroparesis with improved symptoms on standing reglan. Advancing to solids today and then plan for d/c home on Basaglar 20 units and Admelog 4 before meals and low correction scale. Follow up with outpatient endo and will set up for outpatient GI for gastric emptying study. 30 min spent on discharge planning.
48 year old female with history DM1 s/p episode of DKA in setting of b/l pyelonephritis and recent gyn surgery with persistent abdominal discomfort, today more specifically RUQ pain accompanied by vomiting. No dyspepsia, chest pain, palpitations, SOB; drank glucerna short while after vomiting. Afebrile, 's/70's, HR 70's, RR 18, Os 98% on RA. Mild RUQ tenderness on exam, negative Ponce's, no rebound/guarding. WBC, chemistry, Liver enzymes, TB, lipase, all WNL. US gallbladder showed normal gallbladder. Review of medications and chart since admission-patient been utilizing zofran, simethicone and pain medication since admission however pain was initially left sided back pain in setting of b/l pyelonephritis, transitioned to epigastric gas pain and has now evolved to RUQ pain, etiology unclear and will check EKG and consult GI today.

## 2019-08-02 NOTE — PROGRESS NOTE ADULT - PROBLEM SELECTOR PLAN 6
Patient had recent right salpingo-oophrectomy     - CT abd/pelv and CT urogram do not demonstrate abscess or intraabdominal process, no ureteral injury. DVT ppx: SQH q8h

## 2019-08-02 NOTE — PROGRESS NOTE ADULT - SUBJECTIVE AND OBJECTIVE BOX
Patient is a 48y old  Female who presents with a chief complaint of DKA, pyelonephritis (02 Aug 2019 14:13)      INTERVAL HPI/OVERNIGHT EVENTS:  No acute events overnight, patient feeling much improved this morning with no more nausea or abdominal pain after reglan scheduled regimen.  Patient tolerating clear diet well and feeling like she could tolerate advancement to softs.  Patient did have episode of hypoglycemia prior to breakfast.    T(C): 36.9 (19 @ 14:59), Max: 36.9 (19 @ 14:59)  HR: 81 (19 @ 14:59) (77 - 84)  BP: 130/63 (19 @ 14:59) (129/60 - 133/71)  RR: 18 (19 @ 14:59) (16 - 18)  SpO2: 100% (19 @ 14:59) (100% - 100%)  Wt(kg): --  I&O's Summary      PHYSICAL EXAM:  GENERAL: Sitting in bed resting comfortably in NAD.  HEAD:  Atraumatic, Normocephalic  EYES: Enucleated R pupil, EOMs intact b/l   ENMT: Moist Mucus membranes  NERVOUS SYSTEM:  A&Ox4, Normal Motor strenght in b/l Upper and lower extremities, gross sensation to light tough intact in b/l upper and lower extremities, CNs II-XII intact b/l w/out focal deficits.  CHEST/LUNG: CTAB no w/r/r  HEART: RRR no m/g/r  ABDOMEN: +BS, soft, nondistended, nontender  EXTREMITIES:  +2 radial pulses b/l, no LE edema, no calf tenderness  LYMPH: No anterior/Posterior or supraclavicular LAD  SKIN: No new rashes or DTIs, warm, dry, and intact    PAST MEDICAL & SURGICAL HISTORY:  Diabetes mellitus type I  Cyst of right ovary  Diabetes  Eye injury  History of salpingoophorectomy: Right-sided  History of eye enucleation: right  H/O  section  Prosthetic eye globe   delivery delivered      MEDICATIONS  (STANDING):  bisacodyl 5 milliGRAM(s) Oral every 12 hours  cefTRIAXone   IVPB 1000 milliGRAM(s) IV Intermittent every 24 hours  docusate sodium 100 milliGRAM(s) Oral two times a day  heparin  Injectable 5000 Unit(s) SubCutaneous every 8 hours  insulin glargine Injectable (LANTUS) 20 Unit(s) SubCutaneous at bedtime  insulin lispro (HumaLOG) corrective regimen sliding scale   SubCutaneous three times a day before meals  insulin lispro (HumaLOG) corrective regimen sliding scale   SubCutaneous at bedtime  insulin lispro Injectable (HumaLOG) 4 Unit(s) SubCutaneous three times a day before meals  metoclopramide Injectable 10 milliGRAM(s) IV Push every 6 hours  polyethylene glycol 3350 17 Gram(s) Oral daily  simethicone 80 milliGRAM(s) Chew every 12 hours    MEDICATIONS  (PRN):  acetaminophen   Tablet .. 650 milliGRAM(s) Oral every 6 hours PRN Mild Pain (1 - 3), Moderate Pain (4 - 6)  acetaminophen  IVPB .. 1000 milliGRAM(s) IV Intermittent once PRN Severe Pain (7 - 10)  ketorolac   Injectable 15 milliGRAM(s) IV Push every 6 hours PRN Severe Pain (7 - 10)  ondansetron Injectable 4 milliGRAM(s) IV Push every 6 hours PRN Nausea and/or Vomiting      REVIEW OF SYSTEMS:  GENERAL: No fever, no chills, no night sweats, no weight gain or loss  EYES: no change in vision, no blurred vision  HEENT: no trouble swallowing, no trouble speaking, no congestion, no sore throat  NECK: no pain or stiffness  CV: no chest pain or palpitations  RESP: no cough, no shortness of breath  GI: no abdominal pain, no nausea, no vomiting, no diarrhea, no constipation, no BRBPR or melena  : No dysuria, no frequency  NEURO: no headache, no weakness, no dizziness  SKIN: no rashes    LABS:                        10.2   5.92  )-----------( 485      ( 02 Aug 2019 06:00 )             31.1     08-02    139  |  98  |  5<L>  ----------------------------<  82  3.4<L>   |  27  |  0.83    Ca    10.0      02 Aug 2019 06:00  Phos  3.6     08-01  Mg     2.0     08-01    TPro  7.1  /  Alb  3.1<L>  /  TBili  0.2  /  DBili  < 0.2  /  AST  17  /  ALT  15  /  AlkPhos  74  08-    LIVER FUNCTIONS - ( 01 Aug 2019 06:40 )  Alb: 3.1 g/dL / Pro: 7.1 g/dL / ALK PHOS: 74 u/L / ALT: 15 u/L / AST: 17 u/L / GGT: x     / T. Bili 0.2 mg/dL / D. Bili < 0.2 mg/dL         CAPILLARY BLOOD GLUCOSE      POCT Blood Glucose.: 131 mg/dL (02 Aug 2019 12:52)  POCT Blood Glucose.: 131 mg/dL (02 Aug 2019 12:50)  POCT Blood Glucose.: 91 mg/dL (02 Aug 2019 12:32)  POCT Blood Glucose.: 100 mg/dL (02 Aug 2019 12:31)  POCT Blood Glucose.: 59 mg/dL (02 Aug 2019 12:15)  POCT Blood Glucose.: 59 mg/dL (02 Aug 2019 12:13)  POCT Blood Glucose.: 108 mg/dL (02 Aug 2019 08:31)  POCT Blood Glucose.: 80 mg/dL (02 Aug 2019 07:43)  POCT Blood Glucose.: 136 mg/dL (01 Aug 2019 22:14)  POCT Blood Glucose.: 183 mg/dL (01 Aug 2019 17:27) Patient is a 48y old  Female who presents with a chief complaint of DKA, pyelonephritis (02 Aug 2019 14:13)      INTERVAL HPI/OVERNIGHT EVENTS:  No acute events overnight, patient feeling much improved this morning with no more nausea or abdominal pain after reglan scheduled regimen.  Patient tolerating clear diet well and feeling like she could tolerate advancement to softs.  Patient had FS of 80 prior to breakfast.    T(C): 36.9 (19 @ 14:59), Max: 36.9 (19 @ 14:59)  HR: 81 (19 @ 14:59) (77 - 84)  BP: 130/63 (19 @ 14:59) (129/60 - 133/71)  RR: 18 (19 @ 14:59) (16 - 18)  SpO2: 100% (19 @ 14:59) (100% - 100%)  Wt(kg): --  I&O's Summary      PHYSICAL EXAM:  GENERAL: Sitting in bed resting comfortably in NAD.  HEAD:  Atraumatic, Normocephalic  EYES: Enucleated R pupil, EOMs intact b/l   ENMT: Moist Mucus membranes  NERVOUS SYSTEM:  A&Ox4, Normal Motor strenght in b/l Upper and lower extremities, gross sensation to light tough intact in b/l upper and lower extremities, CNs II-XII intact b/l w/out focal deficits.  CHEST/LUNG: CTAB no w/r/r  HEART: RRR no m/g/r  ABDOMEN: +BS, soft, nondistended, nontender  EXTREMITIES:  +2 radial pulses b/l, no LE edema, no calf tenderness  LYMPH: No anterior/Posterior or supraclavicular LAD  SKIN: No new rashes or DTIs, warm, dry, and intact    PAST MEDICAL & SURGICAL HISTORY:  Diabetes mellitus type I  Cyst of right ovary  Diabetes  Eye injury  History of salpingoophorectomy: Right-sided  History of eye enucleation: right  H/O  section  Prosthetic eye globe   delivery delivered      MEDICATIONS  (STANDING):  bisacodyl 5 milliGRAM(s) Oral every 12 hours  cefTRIAXone   IVPB 1000 milliGRAM(s) IV Intermittent every 24 hours  docusate sodium 100 milliGRAM(s) Oral two times a day  heparin  Injectable 5000 Unit(s) SubCutaneous every 8 hours  insulin glargine Injectable (LANTUS) 20 Unit(s) SubCutaneous at bedtime  insulin lispro (HumaLOG) corrective regimen sliding scale   SubCutaneous three times a day before meals  insulin lispro (HumaLOG) corrective regimen sliding scale   SubCutaneous at bedtime  insulin lispro Injectable (HumaLOG) 4 Unit(s) SubCutaneous three times a day before meals  metoclopramide Injectable 10 milliGRAM(s) IV Push every 6 hours  polyethylene glycol 3350 17 Gram(s) Oral daily  simethicone 80 milliGRAM(s) Chew every 12 hours    MEDICATIONS  (PRN):  acetaminophen   Tablet .. 650 milliGRAM(s) Oral every 6 hours PRN Mild Pain (1 - 3), Moderate Pain (4 - 6)  acetaminophen  IVPB .. 1000 milliGRAM(s) IV Intermittent once PRN Severe Pain (7 - 10)  ketorolac   Injectable 15 milliGRAM(s) IV Push every 6 hours PRN Severe Pain (7 - 10)  ondansetron Injectable 4 milliGRAM(s) IV Push every 6 hours PRN Nausea and/or Vomiting      REVIEW OF SYSTEMS:  GENERAL: No fever, no chills, no night sweats, no weight gain or loss  EYES: no change in vision, no blurred vision  HEENT: no trouble swallowing, no trouble speaking, no congestion, no sore throat  NECK: no pain or stiffness  CV: no chest pain or palpitations  RESP: no cough, no shortness of breath  GI: no abdominal pain, no nausea, no vomiting, no diarrhea, no constipation, no BRBPR or melena  : No dysuria, no frequency  NEURO: no headache, no weakness, no dizziness  SKIN: no rashes    LABS:                        10.2   5.92  )-----------( 485      ( 02 Aug 2019 06:00 )             31.1     08-02    139  |  98  |  5<L>  ----------------------------<  82  3.4<L>   |  27  |  0.83    Ca    10.0      02 Aug 2019 06:00  Phos  3.6     08-01  Mg     2.0     08-01    TPro  7.1  /  Alb  3.1<L>  /  TBili  0.2  /  DBili  < 0.2  /  AST  17  /  ALT  15  /  AlkPhos  74  08-    LIVER FUNCTIONS - ( 01 Aug 2019 06:40 )  Alb: 3.1 g/dL / Pro: 7.1 g/dL / ALK PHOS: 74 u/L / ALT: 15 u/L / AST: 17 u/L / T. Bili 0.2 mg/dL / D. Bili < 0.2 mg/dL         CAPILLARY BLOOD GLUCOSE      POCT Blood Glucose.: 131 mg/dL (02 Aug 2019 12:52)  POCT Blood Glucose.: 131 mg/dL (02 Aug 2019 12:50)  POCT Blood Glucose.: 91 mg/dL (02 Aug 2019 12:32)  POCT Blood Glucose.: 100 mg/dL (02 Aug 2019 12:31)  POCT Blood Glucose.: 59 mg/dL (02 Aug 2019 12:15)  POCT Blood Glucose.: 59 mg/dL (02 Aug 2019 12:13)  POCT Blood Glucose.: 108 mg/dL (02 Aug 2019 08:31)  POCT Blood Glucose.: 80 mg/dL (02 Aug 2019 07:43)  POCT Blood Glucose.: 136 mg/dL (01 Aug 2019 22:14)  POCT Blood Glucose.: 183 mg/dL (01 Aug 2019 17:27)

## 2019-08-02 NOTE — PROVIDER CONTACT NOTE (OTHER) - ACTION/TREATMENT ORDERED:
MD aware. will recheck fingerstick until patient is above 100 x2.  NP endocrinologist will come to re-evaluate fingerstick

## 2019-08-02 NOTE — PROGRESS NOTE ADULT - ASSESSMENT
49 yo F with Pmhx of DMI, R eye enucleation, kidney stone and R ovarian cyst s/p salpingooophrectomy on 7/22/2019 presented with nausea/vomiting and RUQ pain, found to have DKA s/p MICU admission and insulin gtt and b/l pyelonephritis s/p abx course. However, pt is persistently experiencing RUQ pain with nausea and vomiting    1) Abdominal Pain, N/V  DDx: gastroparesis, functional constipation, or medication side effects.   RUQ pain in the setting of DKA and pyelonephritis, s/p optimal management. CT A/P and US of the abdomen showed no acute pathology. Pain improved with laxatives and anti-emetics.    Recs:   -Please start patient on clear liquid diet for at least 3 to 5 days.   -Consider continuing proper bowel regimen and anti-emetics such as zofran/reglan/erythromycin.   -Please avoid opiates. Consider pain control with IV tylenol.   -Gastric emptying study once patient is stable-possibly as outpatient. 47 yo F with Pmhx of DMI, R eye enucleation, kidney stone and R ovarian cyst s/p salpingooophrectomy on 7/22/2019 presented with nausea/vomiting and RUQ pain, found to have DKA s/p MICU admission and insulin gtt and b/l pyelonephritis s/p abx course. However, pt is persistently experiencing RUQ pain with nausea and vomiting    1) Abdominal Pain, N/V  DDx: gastroparesis, functional constipation, or medication side effects.   RUQ pain in the setting of DKA and pyelonephritis, s/p optimal management. CT A/P and US of the abdomen showed no acute pathology. Pain improved with laxatives and anti-emetics.    Recs:   -cw clear liquid diet and advance as tolerated  -cw  proper bowel regimen and anti-emetics such as zofran/reglan/erythromycin.   -Please avoid opiates. Consider pain control with IV tylenol.   -Gastric emptying study once patient is stable-possibly as outpatient.  - please call back with additional questions or concerns    We will sign off 49 yo F with Pmhx of DMI, R eye enucleation, kidney stone and R ovarian cyst s/p salpingooophrectomy on 7/22/2019 presented with nausea/vomiting and RUQ pain, found to have DKA s/p MICU admission and insulin gtt and b/l pyelonephritis s/p abx course. However, pt is persistently experiencing RUQ pain with nausea and vomiting    1) Abdominal Pain, N/V  DDx: gastroparesis, functional constipation, or medication side effects.   RUQ pain in the setting of DKA and pyelonephritis, s/p optimal management. CT A/P and US of the abdomen showed no acute pathology. Pain improved with laxatives and anti-emetics.    Recs:   -c/w clear liquid diet and advance as tolerated (low residue)  -c/w  proper bowel regimen and anti-emetics such as zofran/reglan  -Please avoid opiates. Consider pain control with Tylenol.   -Gastric emptying study as outpatient.  - please call back with additional questions or concerns

## 2019-08-03 ENCOUNTER — TRANSCRIPTION ENCOUNTER (OUTPATIENT)
Age: 49
End: 2019-08-03

## 2019-08-03 VITALS
TEMPERATURE: 98 F | RESPIRATION RATE: 16 BRPM | OXYGEN SATURATION: 100 % | SYSTOLIC BLOOD PRESSURE: 115 MMHG | HEART RATE: 68 BPM | DIASTOLIC BLOOD PRESSURE: 71 MMHG

## 2019-08-03 RX ADMIN — Medication 10 MILLIGRAM(S): at 05:07

## 2019-08-03 RX ADMIN — SIMETHICONE 80 MILLIGRAM(S): 80 TABLET, CHEWABLE ORAL at 05:07

## 2019-08-03 RX ADMIN — Medication 5 MILLIGRAM(S): at 05:07

## 2019-08-03 RX ADMIN — Medication 100 MILLIGRAM(S): at 05:07

## 2019-08-03 NOTE — PROGRESS NOTE ADULT - PROBLEM SELECTOR PLAN 1
- Nausea and vomiting improved with Reglan and Zofran, abdominal pain resolved  - GI on board, will perform gastric emptying study outpatient  - Patient tolerating clears with glucerna supplement, will advance to softs for lunch and re-evaluate

## 2019-08-03 NOTE — PROGRESS NOTE ADULT - PROVIDER SPECIALTY LIST ADULT
Endocrinology
GYN
Gastroenterology
Internal Medicine
MICU
Internal Medicine
Internal Medicine

## 2019-08-03 NOTE — PROGRESS NOTE ADULT - PROBLEM SELECTOR PLAN 2
-fasting am fingerstick of 80, patient is only taking clear liquids; will decrease glargine to 20u.  Pre-lunch glucose was 91.  -continue lispro 6qac; decrease pre-meal insulin if patient eating less-appetite improving today

## 2019-08-03 NOTE — DISCHARGE NOTE NURSING/CASE MANAGEMENT/SOCIAL WORK - NSDCDPATPORTLINK_GEN_ALL_CORE
You can access the [facility], offered by Health system, by registering with the following website: [email or web address]

## 2019-08-03 NOTE — PROGRESS NOTE ADULT - REASON FOR ADMISSION
DKA, pyelonephritis

## 2019-08-08 LAB — SURGICAL PATHOLOGY STUDY: SIGNIFICANT CHANGE UP

## 2019-08-14 ENCOUNTER — OUTPATIENT (OUTPATIENT)
Dept: OUTPATIENT SERVICES | Facility: HOSPITAL | Age: 49
LOS: 1 days | End: 2019-08-14

## 2019-08-14 ENCOUNTER — APPOINTMENT (OUTPATIENT)
Dept: OBGYN | Facility: HOSPITAL | Age: 49
End: 2019-08-14
Payer: MEDICAID

## 2019-08-14 VITALS
SYSTOLIC BLOOD PRESSURE: 147 MMHG | HEIGHT: 64 IN | WEIGHT: 150.57 LBS | HEART RATE: 72 BPM | DIASTOLIC BLOOD PRESSURE: 71 MMHG | BODY MASS INDEX: 25.71 KG/M2

## 2019-08-14 DIAGNOSIS — Z98.890 OTHER SPECIFIED POSTPROCEDURAL STATES: Chronic | ICD-10-CM

## 2019-08-14 DIAGNOSIS — Z98.891 HISTORY OF UTERINE SCAR FROM PREVIOUS SURGERY: Chronic | ICD-10-CM

## 2019-08-14 DIAGNOSIS — Z90.79 ACQUIRED ABSENCE OF OTHER GENITAL ORGAN(S): Chronic | ICD-10-CM

## 2019-08-14 DIAGNOSIS — Z97.0 PRESENCE OF ARTIFICIAL EYE: Chronic | ICD-10-CM

## 2019-08-14 PROCEDURE — 99213 OFFICE O/P EST LOW 20 MIN: CPT | Mod: GC

## 2019-08-15 NOTE — HISTORY OF PRESENT ILLNESS
[0/10] : no pain reported [Clean/Dry/Intact] : clean, dry and intact [Doing Well] : is doing well [None] : no vaginal bleeding [Excellent Pain Control] : has excellent pain control [Fever] : no fever [Chills] : no chills [Vomiting] : no vomiting [Nausea] : no nausea [Diarrhea] : no diarrhea [Vaginal Bleeding] : no vaginal bleeding [Pelvic Pressure] : no pelvic pressure [Dysuria] : no dysuria [Vaginal Discharge] : no vaginal discharge [Constipation] : no constipation [Erythema] : not erythematous [Swelling] : not swollen [Dehiscence] : not dehisced [de-identified] : 49yo  w/ PMH T1DM s/p LSC Right salpingo-oophorectomy 19 for severe RLQ pain concerning for ovarian torsion with 14cm right ovarian cyst. Patient was readmitted with pyelonephritis and DKA on POD#5. Patient reports that she has been doing well since discharge. Pain is well controlled and she is without complaints. She denies any fevers, chills, CP/SOB, N/V, dysuria, diarrhea or constipation. Patient reports blood glucose has been well controlled about 70-130s. She is following up with endocrinology.  [de-identified] : 1. Postop [de-identified] : 47yo  w/ PMH T1DM s/p LSC Right salpingo-oophorectomy 19, doing well and in stable condition.

## 2019-08-19 DIAGNOSIS — Z98.890 OTHER SPECIFIED POSTPROCEDURAL STATES: ICD-10-CM

## 2019-12-04 ENCOUNTER — APPOINTMENT (OUTPATIENT)
Dept: OBGYN | Facility: HOSPITAL | Age: 49
End: 2019-12-04

## 2021-08-20 NOTE — DISCHARGE NOTE NURSING/CASE MANAGEMENT/SOCIAL WORK - NSDCPETBCESMAN_GEN_ALL_CORE
If you are a smoker, it is important for your health to stop smoking. Please be aware that second hand smoke is also harmful.
(0) full assist (staff holds infant at breast)

## 2022-01-23 NOTE — CONSULT NOTE ADULT - ASSESSMENT
Attempted right sided pigtail catheter for pleural effusion. Unable to access space. POCUS performed mid procedure, effusion diminished in size. Will defer, plan to consult IR overnight. 47 yo with PMH of Type 1 DM, prosthetic R eye s/p enucleation,  who presents POD#5 s/p laparoscopic right salpingooophorectomy () presented with left flank pain radiating to groin with nausea/vomiting, fevers in the setting of DKA.     Neuro:   -awake and alert, continue to monitor mental status  -pain controlled with Tylenol PRN  -morphine PRN severe pain    Pulm:  -Lungs clear, no issues    Cards:  -Hemodynamically stable  -Monitor potassium and EKG during insulin gtt for risk of hyperkalemia related cardiac changes    GI:   -Zofran for nausea and vomiting  -recent R salpingooophorectomy, f/u CT A/P    :  -Cr normal, continue to trend  -UA with no definitive sign of UTI  -Hematuria possibly due to nephrolithiasis, f/u CT     Endo:  -Type 1 DM in DKA requiring insulin gtt   -Continue to monitor FS q1hr and BMP, VBG q2 hours     ID:  -febrile with leukocytosis  -Post op day 5 R salpingooophorectomy, possible abscess or other surgical complication   -f/u cultures  -Continue with Zosyn    Ppx:   SCDs 47 yo with PMH of Type 1 DM, prosthetic R eye s/p enucleation,  who presents POD#5 s/p laparoscopic right salpingooophorectomy () presented with left flank pain radiating to groin with nausea/vomiting, fevers in the setting of DKA.     Neuro:   -awake and alert, continue to monitor mental status  -pain controlled with Tylenol PRN  -morphine PRN severe pain    Pulm:  -Lungs clear, no issues    Cards:  -Hemodynamically stable  -Monitor potassium and EKG during insulin gtt for risk of hyperkalemia related cardiac changes    GI:   -Zofran for nausea and vomiting  -recent R salpingooophorectomy, f/u CT A/P    :  -Cr normal, continue to trend  -UA with no definitive sign of UTI  -Hematuria possibly due to nephrolithiasis, f/u CT     Endo:  -Type 1 DM in DKA requiring insulin gtt   -Continue to monitor FS q1hr and BMP, VBG q2 hours   -IVF hydration     ID:  -febrile with leukocytosis  -Post op day 5 R salpingooophorectomy, possible abscess or other surgical complication   -f/u cultures  -Continue with Zosyn    Ppx:   SCDs     Discussed with Dr. Brown

## 2023-05-17 NOTE — H&P ADULT - NSHPPOASURGSITEINCISION_GEN_ALL_CORE
MD gave clearance to start pre-cert with [facility]. Patient needs PT/OT evals prior to starting pre-cert. Ordered STAT PT/OT evals for patient today. Will begin pre-cert once eval notes are in. Patient has a bed at [facility]. yes

## 2024-11-05 NOTE — ED ADULT NURSE NOTE - CCCP TRG CHIEF CMPLNT
[FreeTextEntry1] : Nannette is a pleasant 76-year-old female with history of hypercholesteremia, prediabetes, COPD, chronic smoking, dyspnea on exertion.  Overall feels well.  No chest pain.  Continues with [facility]O.. CTA done May 2024 reveals minimal nonobstructive disease of LAD.  Calcium score 68.  10 to 20% stenosis.  Emphysema noted.  It was 71 in April 2024 and 69 in October 2024.  She denies palpitations, orthopnea, LE edema, lightheadedness, dizziness, near syncope or syncope.  No chest pain.  She admits to smoking 5 cigarettes to half a pack per day.    She has shortness of breath on exertion, especially with walking or going upstairs which has been unchanged.  She does not exercise regularly. She denies PND, orthopnea or pedal edema.  She believes her dyspnea is secondary to physical deconditioning and COPD.  No recent COPD exacerbation.  She sees Dr manning for Pulmonary.   Denies claudication or history of TIA.  
abdominal pain

## 2024-12-27 NOTE — PROGRESS NOTE ADULT - PROBLEM SELECTOR PLAN 8
Patient due for 10 month well child visit. Please call to schedule appointment.     If no longer being seen here, please update PCP to \"[facility]\" with a note in the comment field.   DVT ppx: SQH q8h